# Patient Record
Sex: MALE | Race: WHITE | Employment: FULL TIME | URBAN - METROPOLITAN AREA
[De-identification: names, ages, dates, MRNs, and addresses within clinical notes are randomized per-mention and may not be internally consistent; named-entity substitution may affect disease eponyms.]

---

## 2021-07-19 ENCOUNTER — TELEPHONE (OUTPATIENT)
Dept: PAIN MANAGEMENT | Age: 71
End: 2021-07-19

## 2021-07-22 ENCOUNTER — PROCEDURE VISIT (OUTPATIENT)
Dept: PAIN MANAGEMENT | Age: 71
End: 2021-07-22
Payer: MEDICARE

## 2021-07-22 DIAGNOSIS — M47.817 LUMBOSACRAL SPONDYLOSIS WITHOUT MYELOPATHY: Primary | ICD-10-CM

## 2021-07-22 DIAGNOSIS — M47.812 CERVICAL SPONDYLOSIS WITHOUT MYELOPATHY: ICD-10-CM

## 2021-07-22 PROCEDURE — 64494 INJ PARAVERT F JNT L/S 2 LEV: CPT | Performed by: PAIN MEDICINE

## 2021-07-22 PROCEDURE — 3017F COLORECTAL CA SCREEN DOC REV: CPT | Performed by: PAIN MEDICINE

## 2021-07-22 PROCEDURE — 99213 OFFICE O/P EST LOW 20 MIN: CPT | Performed by: PAIN MEDICINE

## 2021-07-22 PROCEDURE — 4040F PNEUMOC VAC/ADMIN/RCVD: CPT | Performed by: PAIN MEDICINE

## 2021-07-22 PROCEDURE — 1123F ACP DISCUSS/DSCN MKR DOCD: CPT | Performed by: PAIN MEDICINE

## 2021-07-22 PROCEDURE — G8427 DOCREV CUR MEDS BY ELIG CLIN: HCPCS | Performed by: PAIN MEDICINE

## 2021-07-22 PROCEDURE — 64493 INJ PARAVERT F JNT L/S 1 LEV: CPT | Performed by: PAIN MEDICINE

## 2021-07-22 PROCEDURE — G8421 BMI NOT CALCULATED: HCPCS | Performed by: PAIN MEDICINE

## 2021-07-22 PROCEDURE — 1036F TOBACCO NON-USER: CPT | Performed by: PAIN MEDICINE

## 2021-07-22 RX ORDER — BETAMETHASONE SODIUM PHOSPHATE AND BETAMETHASONE ACETATE 3; 3 MG/ML; MG/ML
6 INJECTION, SUSPENSION INTRA-ARTICULAR; INTRALESIONAL; INTRAMUSCULAR; SOFT TISSUE ONCE
Status: COMPLETED | OUTPATIENT
Start: 2021-07-22 | End: 2021-07-22

## 2021-07-22 RX ORDER — LIDOCAINE HYDROCHLORIDE 10 MG/ML
2 INJECTION, SOLUTION EPIDURAL; INFILTRATION; INTRACAUDAL; PERINEURAL ONCE
Status: COMPLETED | OUTPATIENT
Start: 2021-07-22 | End: 2021-07-22

## 2021-07-22 RX ADMIN — LIDOCAINE HYDROCHLORIDE 2 ML: 10 INJECTION, SOLUTION EPIDURAL; INFILTRATION; INTRACAUDAL; PERINEURAL at 13:13

## 2021-07-22 RX ADMIN — BETAMETHASONE SODIUM PHOSPHATE AND BETAMETHASONE ACETATE 6 MG: 3; 3 INJECTION, SUSPENSION INTRA-ARTICULAR; INTRALESIONAL; INTRAMUSCULAR; SOFT TISSUE at 13:14

## 2021-07-22 ASSESSMENT — ENCOUNTER SYMPTOMS
BACK PAIN: 0
CONSTIPATION: 0
DIARRHEA: 0
NAUSEA: 0
SHORTNESS OF BREATH: 0

## 2021-07-22 NOTE — PROGRESS NOTES
[facility]) Physicians  Neurosurgery and Pain [facility]  [address], [address] [facility] , [address] [facility], [place]: [phone]  F: [phone]      [address]      Provider: Rafael Mora DO          Patient Name: Ashley Brand : 1950        Date: 2021       Ashley Brand is here today for interventional pain management. Standard ASIPP guidelines were followed and sterile technique used. Area was cleaned with Betadine x3. Informed consent was obtained. Fluoroscopic guidance was used for this procedure. Junction of superior articular process and transverse process was identified. For L5 dorsal primary ramus groove of sacral ala and SAP of S1 was identified. Appropriate length spinal needle was used and advanced to correct anatomic location. Negative aspiration was achieved. At each site approximately 1/2cc of 1% preservative free Lidocaine was injected without difficulty. 6mg celestone added. Patient tolerated the procedure well, no obvious complications occurred during the procedure. Patient was appropriately monitored and discharged home in stable condition with their usual motor strength. The patient will keep close track of pain over the next several hours and call our office tomorrow and let us know what percentage of pain relief is experienced. Post op Instructions were given to patient. Relevant and recent imaging reviewed with patient today. [x] Bilateral [] T12 [] S1      [] L1 [] S2     [] Right [] L2 [] S3      [x] L3      [] Left [x] L4         [x] L5 [] S. I. Patient had >80% pain relief following procedure with positive facet joint provocative maneuvers, schedule RF ablation.     Rafael Mora DO

## 2021-07-22 NOTE — PROGRESS NOTES
[facility]) Physicians  Neurosurgery and Pain [facility]  [address], [address] [place], [place] [address]., [place]: [phone]  F: [phone]        Teresita Jiang  ([id number])    7/22/2021    Subjective:     Teresita Jiang is 70 y.o. male who complains today of:     Chief Complaint   Patient presents with   Sánchez Saleh is here today for follow-up. The pain is mostly on left side of his back. Is worse with bending standing activity. Can be achy can be sharp. He has done therapy in the past.  He has failed conservative treatment. History of interventional pain management. Pain affects his quality life. No new weakness. Patient is not diabetic, he has cardiac issues. Lumbar x-rays from 2019 were reviewed in the chart. Patient takes Tylenol and over-the-counter anti-inflammatories. Some morning time pain. Plan: We discussed options in detail. Today we performed left L3-4-5 medial branch blocks for diagnostic purposes. He may be a candidate for radiofrequency ablation down the road. He has failed conservative treatment. Imaging studies were were reviewed in the chart. Question answered, chart reviewed. Patient defers physical therapy as it has not helped in the past and made the pain worse. Patient states down the road he may want to have his right side of his back done right now the left is worse. We will see him in follow-up in several weeks. Patient also has neck pain arthritis wants injections we will set him up next week for cervical medial branch blocks with dexamethasone. Stop Eloquis 3 days prior. Allergies:  Patient has no known allergies. History reviewed. No pertinent past medical history. History reviewed. No pertinent surgical history. History reviewed. No pertinent family history.   Social History     Socioeconomic History    Marital status:      Spouse name: Not on file    Number of children: Not on file    Years of education: Not on file    Highest education level: Not on file   Occupational History    Not on file   Tobacco Use    Smoking status: Former Smoker    Smokeless tobacco: Never Used   Substance and Sexual Activity    Alcohol use: Yes     Alcohol/week: 9.0 standard drinks     Types: 9 Glasses of wine per week    Drug use: Not on file    Sexual activity: Not on file   Other Topics Concern    Not on file   Social History Narrative    Not on file     Social Determinants of Health     Financial Resource Strain:     Difficulty of Paying Living Expenses:    Food Insecurity:     Worried About Running Out of Food in the Last Year:     [address] in the Last Year:    Transportation Needs:     Lack of Transportation (Medical):  Lack of Transportation (Non-Medical):    Physical Activity:     Days of Exercise per Week:     Minutes of Exercise per Session:    Stress:     Feeling of Stress :    Social Connections:     Frequency of Communication with Friends and Family:     Frequency of Social Gatherings with Friends and Family:     Attends [employer] Services:     Active Member of Clubs or Organizations:     Attends Club or Organization Meetings:     Marital Status:    Intimate Partner Violence:     Fear of Current or Ex-Partner:     Emotionally Abused:     Physically Abused:     Sexually Abused:        Current Outpatient Medications on File Prior to Visit   Medication Sig Dispense Refill    meclizine (ANTIVERT) 25 MG tablet TAKE 1 TABLET BY MOUTH TWICE DAILY  0    apixaban (ELIQUIS) 5 MG TABS tablet Take by mouth 2 times daily      aspirin 81 MG tablet Take 81 mg by mouth daily       No current facility-administered medications on file prior to visit. HPI    Review of Systems   Constitutional: Negative for fever. HENT: Negative for hearing loss. Respiratory: Negative for shortness of breath. Gastrointestinal: Negative for constipation, diarrhea and nausea.    Genitourinary: Negative for difficulty urinating. Musculoskeletal: Negative for back pain and neck pain. Skin: Negative for rash. Neurological: Negative for headaches. Hematological: Does not bruise/bleed easily. Psychiatric/Behavioral: Negative for sleep disturbance. Objective:     Vitals: There were no vitals taken for this visit. H:70 inches, W:205, RR:16    Physical Exam  Patient alert and oriented times three, recent and remote memory intact, mood and affect normal, judgement and insight normal. Strength functional for ambulation. Balance and coordinational functional. Visualized skin intact. No visualized cyanosis, pulses intact. Cranial nerves 2-12 grossly intact. No obvious upper motor neuron signs seen. Sensation intact to light touch. SLR negative. Tender along L lumber facet joints with pain and decreased ROM. Extension and rotation with muscle spasms. Negative Spurling's. Tender along mid/low cervical facet joints with pain and decreased ROM. Extension and rotation with muscle spasms. Assessment:      Diagnosis Orders   1. Lumbosacral spondylosis without myelopathy  MI INJ DX/THER AGNT PARAVERT FACET JOINT, LUMBAR/SAC, 1ST LEVEL    MI INJ DX/THER AGNT PARAVERT FACET JOINT, LUMBAR/SAC, 2ND LEVEL   2.  Cervical spondylosis without myelopathy  MI INJ DX/THER AGNT PARAVERT FACET JOINT, CERV/THORAC, 1ST LEVEL    MI INJ DX/THER AGNT PARAVERT FACET JOINT, CERV/THORAC, 2ND LEVEL       Plan:

## 2021-07-29 ENCOUNTER — PROCEDURE VISIT (OUTPATIENT)
Dept: PAIN MANAGEMENT | Age: 71
End: 2021-07-29
Payer: MEDICARE

## 2021-07-29 DIAGNOSIS — M47.812 CERVICAL SPONDYLOSIS WITHOUT MYELOPATHY: ICD-10-CM

## 2021-07-29 PROCEDURE — 64490 INJ PARAVERT F JNT C/T 1 LEV: CPT | Performed by: PAIN MEDICINE

## 2021-07-29 PROCEDURE — 64491 INJ PARAVERT F JNT C/T 2 LEV: CPT | Performed by: PAIN MEDICINE

## 2021-07-29 RX ORDER — LIDOCAINE HYDROCHLORIDE 10 MG/ML
2 INJECTION, SOLUTION EPIDURAL; INFILTRATION; INTRACAUDAL; PERINEURAL ONCE
Status: COMPLETED | OUTPATIENT
Start: 2021-07-29 | End: 2021-07-29

## 2021-07-29 RX ORDER — DEXAMETHASONE SODIUM PHOSPHATE 10 MG/ML
10 INJECTION, EMULSION INTRAMUSCULAR; INTRAVENOUS ONCE
Status: COMPLETED | OUTPATIENT
Start: 2021-07-29 | End: 2021-07-29

## 2021-07-29 RX ADMIN — LIDOCAINE HYDROCHLORIDE 2 ML: 10 INJECTION, SOLUTION EPIDURAL; INFILTRATION; INTRACAUDAL; PERINEURAL at 13:27

## 2021-07-29 RX ADMIN — DEXAMETHASONE SODIUM PHOSPHATE 10 MG: 10 INJECTION, EMULSION INTRAMUSCULAR; INTRAVENOUS at 13:27

## 2021-07-29 NOTE — PROGRESS NOTES
[facility]) Physicians  Neurosurgery and Pain [facility]  [address] [facility] , [address] [facility], [place]: [phone]  F: [phone]        [address] [facility] [address]    Provider: Ladan Mayfield DO          Patient Name: Eriberto Simpson : 1950        Date: 2021         Eriberto Simpson is here today for interventional pain management. Sterile technique was used. Fluoroscopic guidance was used. Patient positioned in prone position. Area was cleaned with Betadine. Informed consent was obtained. Appropriate length spinal needle was advanced to the anatomic location of the medial branch at the lateral mass utilizing intermittent fluoroscopy. Approximately 5mg Dexamethasone was divided equally at each level and 0.5cc of 1% PF Lidocaine was injected at each anatomic level without difficulty. Patient tolerated the procedure well, no obvious complications occurred during the procedure. Patient was appropriately monitored and discharged home in stable condition with their usual motor strength. The patient will keep close track of pain over the next several hours and call our office tomorrow and let us know what percentage of pain relief is experienced. Post op Instructions were given to patient. If on blood thiners patient was told to resume them post-procedure. Relevant and recent imaging reviewed with patient today. [x] Bilateral [] C2 [] C3      [] C4 [x] C5     [] Right [x] C6 [x] C7            [] Left                                      Patient had >80% pain relief following procedure with positive facet joint provocativemaneuvers, schedule RF ablation.       Ladan Mayfield DO

## 2021-10-22 ENCOUNTER — TELEPHONE (OUTPATIENT)
Dept: PAIN MANAGEMENT | Age: 71
End: 2021-10-22

## 2021-10-26 ENCOUNTER — PROCEDURE VISIT (OUTPATIENT)
Dept: PAIN MANAGEMENT | Age: 71
End: 2021-10-26
Payer: MEDICARE

## 2021-10-26 DIAGNOSIS — M47.817 LUMBOSACRAL SPONDYLOSIS WITHOUT MYELOPATHY: Primary | ICD-10-CM

## 2021-10-26 PROCEDURE — 64493 INJ PARAVERT F JNT L/S 1 LEV: CPT | Performed by: PAIN MEDICINE

## 2021-10-26 PROCEDURE — 64494 INJ PARAVERT F JNT L/S 2 LEV: CPT | Performed by: PAIN MEDICINE

## 2021-10-26 RX ORDER — BETAMETHASONE SODIUM PHOSPHATE AND BETAMETHASONE ACETATE 3; 3 MG/ML; MG/ML
6 INJECTION, SUSPENSION INTRA-ARTICULAR; INTRALESIONAL; INTRAMUSCULAR; SOFT TISSUE ONCE
Status: COMPLETED | OUTPATIENT
Start: 2021-10-26 | End: 2021-10-26

## 2021-10-26 RX ORDER — LIDOCAINE HYDROCHLORIDE 10 MG/ML
10 INJECTION, SOLUTION EPIDURAL; INFILTRATION; INTRACAUDAL; PERINEURAL ONCE
Status: COMPLETED | OUTPATIENT
Start: 2021-10-26 | End: 2021-10-26

## 2021-10-26 RX ADMIN — LIDOCAINE HYDROCHLORIDE 10 MG: 10 INJECTION, SOLUTION EPIDURAL; INFILTRATION; INTRACAUDAL; PERINEURAL at 15:54

## 2021-10-26 RX ADMIN — BETAMETHASONE SODIUM PHOSPHATE AND BETAMETHASONE ACETATE 6 MG: 3; 3 INJECTION, SUSPENSION INTRA-ARTICULAR; INTRALESIONAL; INTRAMUSCULAR; SOFT TISSUE at 15:54

## 2021-10-26 NOTE — PROGRESS NOTES
[facility]) Physicians  Neurosurgery and Pain [facility]  [address], [address] [facility] , [address] [place] [address]: [phone]  F: [phone]      [address]      Provider: Bony Balderrama DO          Patient Name: Liseth Salomon : 1950        Date: 10/26/2021       Liseth Salomon is here today for interventional pain management. Standard ASIPP guidelines were followed and sterile technique used. Area was cleaned with Betadine x3. Informed consent was obtained. Fluoroscopic guidance was used for this procedure. Junction of superior articular process and transverse process was identified. For L5 dorsal primary ramus groove of sacral ala and SAP of S1 was identified. Appropriate length spinal needle was used and advanced to correct anatomic location. Negative aspiration was achieved. At each site approximately 1/2cc of 1% preservative free Lidocaine was injected without difficulty. Patient tolerated the procedure well, no obvious complications occurred during the procedure. Patient was appropriately monitored and discharged home in stable condition with their usual motor strength. The patient will keep close track of pain over the next several hours and call our office tomorrow and let us know what percentage of pain relief is experienced. Post op Instructions were given to patient. Relevant and recent imaging reviewed with patient today. [x] Bilateral [] T12 [] S1      [] L1 [] S2     [] Right [] L2 [] S3      [x] L3      [] Left [x] L4         [x] L5 [] S. I. Patient had >80% pain relief following procedure with positive facet joint provocative maneuvers, schedule RF ablation.     Bony Balderrama DO

## 2021-11-01 ENCOUNTER — PROCEDURE VISIT (OUTPATIENT)
Dept: PAIN MANAGEMENT | Age: 71
End: 2021-11-01
Payer: MEDICARE

## 2021-11-01 DIAGNOSIS — M47.812 CERVICAL SPONDYLOSIS WITHOUT MYELOPATHY: Primary | ICD-10-CM

## 2021-11-01 PROCEDURE — 64490 INJ PARAVERT F JNT C/T 1 LEV: CPT | Performed by: PAIN MEDICINE

## 2021-11-01 PROCEDURE — 64491 INJ PARAVERT F JNT C/T 2 LEV: CPT | Performed by: PAIN MEDICINE

## 2021-11-01 RX ORDER — DEXAMETHASONE SODIUM PHOSPHATE 10 MG/ML
10 INJECTION, EMULSION INTRAMUSCULAR; INTRAVENOUS ONCE
Status: COMPLETED | OUTPATIENT
Start: 2021-11-01 | End: 2021-11-01

## 2021-11-01 RX ORDER — LIDOCAINE HYDROCHLORIDE 10 MG/ML
10 INJECTION, SOLUTION EPIDURAL; INFILTRATION; INTRACAUDAL; PERINEURAL ONCE
Status: COMPLETED | OUTPATIENT
Start: 2021-11-01 | End: 2021-11-01

## 2021-11-01 RX ADMIN — DEXAMETHASONE SODIUM PHOSPHATE 10 MG: 10 INJECTION, EMULSION INTRAMUSCULAR; INTRAVENOUS at 13:16

## 2021-11-01 RX ADMIN — LIDOCAINE HYDROCHLORIDE 10 MG: 10 INJECTION, SOLUTION EPIDURAL; INFILTRATION; INTRACAUDAL; PERINEURAL at 13:16

## 2021-11-01 NOTE — PROGRESS NOTES
[facility]) Physicians  Neurosurgery and Pain [facility]  [address] [facility] , [address] [facility], [place]: [phone]  F: [phone]        [address] [facility] [address]    Provider: Gabbi Pelayo DO          Patient Name: Barbi Alan : 1950        Date: 2021         Barbi Alan is here today for interventional pain management. Negative Spurling's. Tender along R mid/low cervical facet joints with pain and decreased ROM. Extension and rotation with muscle spasms. Sterile technique was used. Fluoroscopic guidance was used. Patient positioned in prone position. Area was cleaned with Betadine. Informed consent was obtained. Appropriate length spinal needle was advanced to the anatomic location of the medial branch at the lateral mass utilizing intermittent fluoroscopy. Approximately 5mg Dexamethasone was divided equally at each level and 0.5cc of 1% PF Lidocaine was injected at each anatomic level without difficulty. Patient tolerated the procedure well, no obvious complications occurred during the procedure. Patient was appropriately monitored and discharged home in stable condition with their usual motor strength. The patient will keep close track of pain over the next several hours and call our office tomorrow and let us know what percentage of pain relief is experienced. Post op Instructions were given to patient. If on blood thiners patient was told to resume them post-procedure. Relevant and recent imaging reviewed with patient today. [] Bilateral [] C2 [] C3      [x] C4 [x] C5     [x] Right [x] C6 [] C7            [] Left                                      Patient had >80% pain relief following procedure with positive facet joint provocativemaneuvers, schedule RF ablation.       Gabbi Pelayo DO

## 2022-02-18 ENCOUNTER — TELEPHONE (OUTPATIENT)
Dept: PAIN MANAGEMENT | Age: 72
End: 2022-02-18

## 2022-02-22 ENCOUNTER — PROCEDURE VISIT (OUTPATIENT)
Dept: PAIN MANAGEMENT | Age: 72
End: 2022-02-22
Payer: MEDICARE

## 2022-02-22 DIAGNOSIS — M47.817 LUMBOSACRAL SPONDYLOSIS WITHOUT MYELOPATHY: Primary | ICD-10-CM

## 2022-02-22 PROCEDURE — 64494 INJ PARAVERT F JNT L/S 2 LEV: CPT | Performed by: PAIN MEDICINE

## 2022-02-22 PROCEDURE — 64493 INJ PARAVERT F JNT L/S 1 LEV: CPT | Performed by: PAIN MEDICINE

## 2022-02-22 RX ORDER — LIDOCAINE HYDROCHLORIDE 10 MG/ML
10 INJECTION, SOLUTION EPIDURAL; INFILTRATION; INTRACAUDAL; PERINEURAL ONCE
Status: COMPLETED | OUTPATIENT
Start: 2022-02-22 | End: 2022-02-22

## 2022-02-22 RX ORDER — BETAMETHASONE SODIUM PHOSPHATE AND BETAMETHASONE ACETATE 3; 3 MG/ML; MG/ML
6 INJECTION, SUSPENSION INTRA-ARTICULAR; INTRALESIONAL; INTRAMUSCULAR; SOFT TISSUE ONCE
Status: COMPLETED | OUTPATIENT
Start: 2022-02-22 | End: 2022-02-22

## 2022-02-22 RX ADMIN — BETAMETHASONE SODIUM PHOSPHATE AND BETAMETHASONE ACETATE 6 MG: 3; 3 INJECTION, SUSPENSION INTRA-ARTICULAR; INTRALESIONAL; INTRAMUSCULAR; SOFT TISSUE at 10:30

## 2022-02-22 RX ADMIN — LIDOCAINE HYDROCHLORIDE 10 MG: 10 INJECTION, SOLUTION EPIDURAL; INFILTRATION; INTRACAUDAL; PERINEURAL at 10:30

## 2022-02-22 NOTE — PROGRESS NOTES
[facility]) Physicians  Neurosurgery and Pain [facility]  [address] [facility] , [address] [facility], [place]: [phone]  F: [phone]      [address]      Provider: Vega Perry DO          Patient Name: Priyanka Montelongo : 1950        Date: 2022       Priyanka Montelongo is here today for interventional pain management. Standard ASIPP guidelines were followed and sterile technique used. Area was cleaned with Betadine x3. Informed consent was obtained. Fluoroscopic guidance was used for this procedure. Junction of superior articular process and transverse process was identified. For L5 dorsal primary ramus groove of sacral ala and SAP of S1 was identified. Appropriate length spinal needle was used and advanced to correct anatomic location. Negative aspiration was achieved. At each site approximately 1/2cc of 1% preservative free Lidocaine was injected without difficulty. 6mg celestone added. Patient tolerated the procedure well, no obvious complications occurred during the procedure. Patient was appropriately monitored and discharged home in stable condition with their usual motor strength. The patient will keep close track of pain over the next several hours and call our office tomorrow and let us know what percentage of pain relief is experienced. Post op Instructions were given to patient. Relevant and recent imaging reviewed with patient today. [x] Bilateral [] T12 [] S1      [] L1 [] S2     [] Right [] L2 [] S3      [x] L3      [] Left [x] L4         [x] L5 [] S. I.                        May need second MBB    Vega Perry DO

## 2022-05-10 ENCOUNTER — TELEPHONE (OUTPATIENT)
Dept: PAIN MANAGEMENT | Age: 72
End: 2022-05-10

## 2022-05-17 ENCOUNTER — PROCEDURE VISIT (OUTPATIENT)
Dept: PAIN MANAGEMENT | Age: 72
End: 2022-05-17
Payer: MEDICARE

## 2022-05-17 DIAGNOSIS — M47.817 LUMBOSACRAL SPONDYLOSIS WITHOUT MYELOPATHY: Primary | ICD-10-CM

## 2022-05-17 PROCEDURE — 64494 INJ PARAVERT F JNT L/S 2 LEV: CPT | Performed by: PAIN MEDICINE

## 2022-05-17 PROCEDURE — 64493 INJ PARAVERT F JNT L/S 1 LEV: CPT | Performed by: PAIN MEDICINE

## 2022-05-17 RX ORDER — BETAMETHASONE SODIUM PHOSPHATE AND BETAMETHASONE ACETATE 3; 3 MG/ML; MG/ML
6 INJECTION, SUSPENSION INTRA-ARTICULAR; INTRALESIONAL; INTRAMUSCULAR; SOFT TISSUE ONCE
Status: COMPLETED | OUTPATIENT
Start: 2022-05-17 | End: 2022-05-17

## 2022-05-17 RX ORDER — LIDOCAINE HYDROCHLORIDE 10 MG/ML
10 INJECTION, SOLUTION EPIDURAL; INFILTRATION; INTRACAUDAL; PERINEURAL ONCE
Status: COMPLETED | OUTPATIENT
Start: 2022-05-17 | End: 2022-05-17

## 2022-05-17 RX ADMIN — LIDOCAINE HYDROCHLORIDE 10 MG: 10 INJECTION, SOLUTION EPIDURAL; INFILTRATION; INTRACAUDAL; PERINEURAL at 15:35

## 2022-05-17 RX ADMIN — BETAMETHASONE SODIUM PHOSPHATE AND BETAMETHASONE ACETATE 6 MG: 3; 3 INJECTION, SUSPENSION INTRA-ARTICULAR; INTRALESIONAL; INTRAMUSCULAR; SOFT TISSUE at 15:35

## 2022-05-17 NOTE — PROGRESS NOTES
[facility]) Physicians  Neurosurgery and Pain [facility]  [address] [facility] , [address] [facility], [place]: [phone]  F: [phone]      [address]      Provider: Sheela Luis DO          Patient Name: Kenney Munoz : 1950        Date: 2022       Kenney Munoz is here today for interventional pain management. Standard ASIPP guidelines were followed and sterile technique used. Area was cleaned with Betadine x3. Informed consent was obtained. Fluoroscopic guidance was used for this procedure. Junction of superior articular process and transverse process was identified. For L5 dorsal primary ramus groove of sacral ala and SAP of S1 was identified. Appropriate length spinal needle was used and advanced to correct anatomic location. Negative aspiration was achieved. At each site approximately 1/2cc of 1% preservative free Lidocaine was injected without difficulty. 6 mg Celestone added    Patient tolerated the procedure well, no obvious complications occurred during the procedure. Patient was appropriately monitored and discharged home in stable condition with their usual motor strength. The patient will keep close track of pain over the next several hours and call our office tomorrow and let us know what percentage of pain relief is experienced. Post op Instructions were given to patient. Relevant and recent imaging reviewed with patient today. [x] Bilateral [] T12 [] S1      [] L1 [] S2     [] Right [] L2 [] S3      [x] L3      [] Left [x] L4         [x] L5 [] S. I.                      May need RF    Sheela Luis DO

## 2022-05-24 ENCOUNTER — PROCEDURE VISIT (OUTPATIENT)
Dept: PAIN MANAGEMENT | Age: 72
End: 2022-05-24
Payer: MEDICARE

## 2022-05-24 DIAGNOSIS — M47.812 CERVICAL SPONDYLOSIS WITHOUT MYELOPATHY: Primary | ICD-10-CM

## 2022-05-24 PROCEDURE — 64491 INJ PARAVERT F JNT C/T 2 LEV: CPT | Performed by: PAIN MEDICINE

## 2022-05-24 PROCEDURE — 64490 INJ PARAVERT F JNT C/T 1 LEV: CPT | Performed by: PAIN MEDICINE

## 2022-05-24 RX ORDER — LIDOCAINE HYDROCHLORIDE 10 MG/ML
10 INJECTION, SOLUTION EPIDURAL; INFILTRATION; INTRACAUDAL; PERINEURAL ONCE
Status: COMPLETED | OUTPATIENT
Start: 2022-05-24 | End: 2022-05-24

## 2022-05-24 RX ORDER — DEXAMETHASONE SODIUM PHOSPHATE 10 MG/ML
10 INJECTION, EMULSION INTRAMUSCULAR; INTRAVENOUS ONCE
Status: COMPLETED | OUTPATIENT
Start: 2022-05-24 | End: 2022-05-24

## 2022-05-24 RX ADMIN — LIDOCAINE HYDROCHLORIDE 10 MG: 10 INJECTION, SOLUTION EPIDURAL; INFILTRATION; INTRACAUDAL; PERINEURAL at 10:40

## 2022-05-24 RX ADMIN — DEXAMETHASONE SODIUM PHOSPHATE 10 MG: 10 INJECTION, EMULSION INTRAMUSCULAR; INTRAVENOUS at 10:41

## 2022-05-24 NOTE — PROGRESS NOTES
[facility]) Physicians  Neurosurgery and Pain [place] [address] [facility], [address] [facility] , [address] [facility], [place]: [phone]  F: [phone]        [address] [facility] [address]    Provider: Ladan Mayfield DO          Patient Name: Eriberto Simpson : 1950        Date: 2022         Eriberto Simpson is here today for interventional pain management. Sterile technique was used. Fluoroscopic guidance was used. Patient positioned in prone position. Area was cleaned with Betadine. Informed consent was obtained. Appropriate length spinal needle was advanced to the anatomic location of the medial branch at the lateral mass utilizing intermittent fluoroscopy. Approximately 5mg Dexamethasone was divided equally at each level and 0.5cc of 1% PF Lidocaine was injected at each anatomic level without difficulty. Patient tolerated the procedure well, no obvious complications occurred during the procedure. Patient was appropriately monitored and discharged home in stable condition with their usual motor strength. The patient will keep close track of pain over the next several hours and call our office tomorrow and let us know what percentage of pain relief is experienced. Post op Instructions were given to patient. If on blood thiners patient was told to resume them post-procedure. Relevant and recent imaging reviewed with patient today. [x] Bilateral [] C2 [] C3      [x] C4 [x] C5     [] Right [x] C6 [] C7            [] Left                                      Patient had >80% pain relief following procedure with positive facet joint provocativemaneuvers, schedule RF ablation.       Ladan Mayfield DO

## 2022-09-14 ENCOUNTER — TELEPHONE (OUTPATIENT)
Dept: PAIN MANAGEMENT | Age: 72
End: 2022-09-14

## 2022-09-14 NOTE — TELEPHONE ENCOUNTER
Patient asks if he can schedule bilateral back injections and right neck injections.     Last received 5/17/22 and 5/24/22

## 2022-09-27 ENCOUNTER — PROCEDURE VISIT (OUTPATIENT)
Dept: PAIN MANAGEMENT | Age: 72
End: 2022-09-27
Payer: MEDICARE

## 2022-09-27 DIAGNOSIS — M47.817 LUMBOSACRAL SPONDYLOSIS WITHOUT MYELOPATHY: Primary | ICD-10-CM

## 2022-09-27 PROCEDURE — 64493 INJ PARAVERT F JNT L/S 1 LEV: CPT | Performed by: PAIN MEDICINE

## 2022-09-27 PROCEDURE — 64494 INJ PARAVERT F JNT L/S 2 LEV: CPT | Performed by: PAIN MEDICINE

## 2022-09-27 RX ORDER — LIDOCAINE HYDROCHLORIDE 10 MG/ML
10 INJECTION, SOLUTION EPIDURAL; INFILTRATION; INTRACAUDAL; PERINEURAL ONCE
Status: COMPLETED | OUTPATIENT
Start: 2022-09-27 | End: 2022-09-27

## 2022-09-27 RX ORDER — BETAMETHASONE SODIUM PHOSPHATE AND BETAMETHASONE ACETATE 3; 3 MG/ML; MG/ML
6 INJECTION, SUSPENSION INTRA-ARTICULAR; INTRALESIONAL; INTRAMUSCULAR; SOFT TISSUE ONCE
Status: COMPLETED | OUTPATIENT
Start: 2022-09-27 | End: 2022-09-27

## 2022-09-27 RX ADMIN — BETAMETHASONE SODIUM PHOSPHATE AND BETAMETHASONE ACETATE 6 MG: 3; 3 INJECTION, SUSPENSION INTRA-ARTICULAR; INTRALESIONAL; INTRAMUSCULAR; SOFT TISSUE at 13:20

## 2022-09-27 RX ADMIN — LIDOCAINE HYDROCHLORIDE 10 MG: 10 INJECTION, SOLUTION EPIDURAL; INFILTRATION; INTRACAUDAL; PERINEURAL at 13:19

## 2022-09-27 NOTE — PROGRESS NOTES
[facility]) Physicians  Neurosurgery and Pain [facility]  [address] [facility] , [address] [facility], [place]: [phone]  F: [phone]      [address]      Provider: Jeri De Guzman DO          Patient Name: Manoj Gonzales : 1950        Date: 2022       Manoj Gonzales is here today for interventional pain management. Standard ASIPP guidelines were followed and sterile technique used. Area was cleaned with Betadine x3. Informed consent was obtained. Fluoroscopic guidance was used for this procedure. Junction of superior articular process and transverse process was identified. For L5 dorsal primary ramus groove of sacral ala and SAP of S1 was identified. Appropriate length spinal needle was used and advanced to correct anatomic location. Negative aspiration was achieved. At each site approximately 1/2cc of 1% preservative free Lidocaine was injected without difficulty. 6mg celestone added. Patient tolerated the procedure well, no obvious complications occurred during the procedure. Patient was appropriately monitored and discharged home in stable condition with their usual motor strength. The patient will keep close track of pain over the next several hours and call our office tomorrow and let us know what percentage of pain relief is experienced. Post op Instructions were given to patient. Relevant and recent imaging reviewed with patient today. [x] Bilateral [] T12 [] S1      [] L1 [] S2     [] Right [] L2 [] S3      [x] L3      [] Left [x] L4         [x] L5 [] KATY Calloway DO
All other review of systems negative, except as noted in HPI

## 2022-10-11 ENCOUNTER — PROCEDURE VISIT (OUTPATIENT)
Dept: PAIN MANAGEMENT | Age: 72
End: 2022-10-11
Payer: MEDICARE

## 2022-10-11 DIAGNOSIS — M47.812 CERVICAL SPONDYLOSIS WITHOUT MYELOPATHY: Primary | ICD-10-CM

## 2022-10-11 PROCEDURE — 64491 INJ PARAVERT F JNT C/T 2 LEV: CPT | Performed by: PAIN MEDICINE

## 2022-10-11 PROCEDURE — 64490 INJ PARAVERT F JNT C/T 1 LEV: CPT | Performed by: PAIN MEDICINE

## 2022-10-11 RX ORDER — DEXAMETHASONE SODIUM PHOSPHATE 10 MG/ML
10 INJECTION, SOLUTION INTRAMUSCULAR; INTRAVENOUS ONCE
Status: COMPLETED | OUTPATIENT
Start: 2022-10-11 | End: 2022-10-11

## 2022-10-11 RX ORDER — DEXAMETHASONE SODIUM PHOSPHATE 10 MG/ML
10 INJECTION, EMULSION INTRAMUSCULAR; INTRAVENOUS ONCE
Status: SHIPPED | OUTPATIENT
Start: 2022-10-11

## 2022-10-11 RX ORDER — LIDOCAINE HYDROCHLORIDE 10 MG/ML
10 INJECTION, SOLUTION EPIDURAL; INFILTRATION; INTRACAUDAL; PERINEURAL ONCE
Status: COMPLETED | OUTPATIENT
Start: 2022-10-11 | End: 2022-10-11

## 2022-10-11 RX ADMIN — DEXAMETHASONE SODIUM PHOSPHATE 10 MG: 10 INJECTION, SOLUTION INTRAMUSCULAR; INTRAVENOUS at 14:37

## 2022-10-11 RX ADMIN — LIDOCAINE HYDROCHLORIDE 10 MG: 10 INJECTION, SOLUTION EPIDURAL; INFILTRATION; INTRACAUDAL; PERINEURAL at 14:37

## 2022-10-11 NOTE — PROGRESS NOTES
[facility]) Physicians  Neurosurgery and Pain [facility]  [address] [facility] , [address], [place]: [phone]  F: [phone]        [address] [facility] [address]    Provider: Bri Mcallister DO          Patient Name: Qian Stein : 1950        Date: 10/11/2022         Qian Stein is here today for interventional pain management. Sterile technique was used. Fluoroscopic guidance was used. Patient positioned in prone position. Area was cleaned with Betadine. Informed consent was obtained. Appropriate length spinal needle was advanced to the anatomic location of the medial branch at the lateral mass utilizing intermittent fluoroscopy. Approximately 5mg Dexamethasone was divided equally at each level and 0.5cc of 1% PF Lidocaine was injected at each anatomic level without difficulty. Patient tolerated the procedure well, no obvious complications occurred during the procedure. Patient was appropriately monitored and discharged home in stable condition with their usual motor strength. The patient will keep close track of pain over the next several hours and call our office tomorrow and let us know what percentage of pain relief is experienced. Post op Instructions were given to patient. If on blood thiners patient was told to resume them post-procedure. Relevant and recent imaging reviewed with patient today.              [] Bilateral [] C2 [] C3      [x] C4 [x] C5     [x] Right [x] C6 [] C7            [] Left                                            Bri Mcallister DO

## 2022-12-19 ENCOUNTER — TELEPHONE (OUTPATIENT)
Dept: PAIN MANAGEMENT | Age: 72
End: 2022-12-19

## 2022-12-19 NOTE — TELEPHONE ENCOUNTER
Patient had his MBB's done in October 2022 with 85% relief and is asking if can now schedule his RFA?

## 2022-12-27 NOTE — TELEPHONE ENCOUNTER
Pt states he takes eliquis prescribed Dr. Williams Jackson at [facility]. Does pt have to stop blood thinners for lumbar RFA?

## 2022-12-28 NOTE — TELEPHONE ENCOUNTER
LVM to schedule patient's Cervical RFA and ask him which side he'd prefer having done first, the right or the left?

## 2023-01-09 ENCOUNTER — OFFICE VISIT (OUTPATIENT)
Dept: PAIN MANAGEMENT | Age: 73
End: 2023-01-09

## 2023-01-09 DIAGNOSIS — M47.817 LUMBOSACRAL SPONDYLOSIS WITHOUT MYELOPATHY: Primary | ICD-10-CM

## 2023-01-09 RX ORDER — DEXAMETHASONE SODIUM PHOSPHATE 10 MG/ML
10 INJECTION, EMULSION INTRAMUSCULAR; INTRAVENOUS ONCE
Status: DISCONTINUED | OUTPATIENT
Start: 2023-01-09 | End: 2023-01-09

## 2023-01-09 RX ORDER — BETAMETHASONE SODIUM PHOSPHATE AND BETAMETHASONE ACETATE 3; 3 MG/ML; MG/ML
6 INJECTION, SUSPENSION INTRA-ARTICULAR; INTRALESIONAL; INTRAMUSCULAR; SOFT TISSUE ONCE
Status: COMPLETED | OUTPATIENT
Start: 2023-01-09 | End: 2023-01-09

## 2023-01-09 RX ORDER — LIDOCAINE HYDROCHLORIDE 10 MG/ML
10 INJECTION, SOLUTION EPIDURAL; INFILTRATION; INTRACAUDAL; PERINEURAL ONCE
Status: COMPLETED | OUTPATIENT
Start: 2023-01-09 | End: 2023-01-09

## 2023-01-09 RX ADMIN — LIDOCAINE HYDROCHLORIDE 10 MG: 10 INJECTION, SOLUTION EPIDURAL; INFILTRATION; INTRACAUDAL; PERINEURAL at 10:08

## 2023-01-09 RX ADMIN — BETAMETHASONE SODIUM PHOSPHATE AND BETAMETHASONE ACETATE 6 MG: 3; 3 INJECTION, SUSPENSION INTRA-ARTICULAR; INTRALESIONAL; INTRAMUSCULAR; SOFT TISSUE at 10:08

## 2023-01-09 NOTE — PROGRESS NOTES
[facility]) Physicians  Neurosurgery and Pain [facility]  [address] [facility] , [address] [facility], [place]: [phone]  F: [phone]      [address]      Provider: Malik Melgoza DO          Patient Name: Lamar Mejia : 1950        Date: 2023       Lamar Mejia is here today for interventional pain management. Majority of symptoms on the left low back. Neck has improved considerably does not want injections there today. Standard ASIPP guidelines were followed and sterile technique used. Area was cleaned with Betadine x3. Informed consent was obtained. Fluoroscopic guidance was used for this procedure. Junction of superior articular process and transverse process was identified. For L5 dorsal primary ramus groove of sacral ala and SAP of S1 was identified. Appropriate length spinal needle was used and advanced to correct anatomic location. Negative aspiration was achieved. At each site approximately 1/2cc of 1% preservative free Lidocaine was injected without difficulty. 6 mg Celestone added    Patient tolerated the procedure well, no obvious complications occurred during the procedure. Patient was appropriately monitored and discharged home in stable condition with their usual motor strength. The patient will keep close track of pain over the next several hours and call our office tomorrow and let us know what percentage of pain relief is experienced. Post op Instructions were given to patient. Relevant and recent imaging reviewed with patient today. [] Bilateral [] T12 [] S1      [] L1 [] S2     [] Right [] L2 [] S3      [x] L3      [x] Left [x] L4         [x] L5 [] KATY Bnoe DO

## 2023-03-17 ENCOUNTER — TELEPHONE (OUTPATIENT)
Dept: PAIN MANAGEMENT | Age: 73
End: 2023-03-17

## 2023-03-27 ENCOUNTER — PROCEDURE VISIT (OUTPATIENT)
Dept: PAIN MANAGEMENT | Age: 73
End: 2023-03-27
Payer: MEDICARE

## 2023-03-27 DIAGNOSIS — M47.817 LUMBOSACRAL SPONDYLOSIS WITHOUT MYELOPATHY: Primary | ICD-10-CM

## 2023-03-27 PROCEDURE — 64494 INJ PARAVERT F JNT L/S 2 LEV: CPT | Performed by: PAIN MEDICINE

## 2023-03-27 PROCEDURE — 64493 INJ PARAVERT F JNT L/S 1 LEV: CPT | Performed by: PAIN MEDICINE

## 2023-03-27 PROCEDURE — 64495 INJ PARAVERT F JNT L/S 3 LEV: CPT | Performed by: PAIN MEDICINE

## 2023-03-27 RX ORDER — LIDOCAINE HYDROCHLORIDE 10 MG/ML
10 INJECTION, SOLUTION EPIDURAL; INFILTRATION; INTRACAUDAL; PERINEURAL ONCE
Status: COMPLETED | OUTPATIENT
Start: 2023-03-27 | End: 2023-03-27

## 2023-03-27 RX ADMIN — LIDOCAINE HYDROCHLORIDE 10 MG: 10 INJECTION, SOLUTION EPIDURAL; INFILTRATION; INTRACAUDAL; PERINEURAL at 15:03

## 2023-03-27 NOTE — PROGRESS NOTES
[facility]) Physicians  Neurosurgery and Pain [facility]  [address] [facility] [address], [address] [facility] , [address] [facility], [place]: [phone]  F: [phone]      Amanda Núñez      Provider: Shaquille Hwang DO          Patient Name: Tino Smith : 1950        Date: 3/27/2023       Tino Smith is here today for interventional pain management. Standard ASIPP guidelines were followed and sterile technique used. Area was cleaned with Betadine x3. Informed consent was obtained. Fluoroscopic guidance was used for this procedure. Junction of superior articular process and transverse process was identified. For L5 dorsal primary ramus groove of sacral ala and SAP of S1 was identified. Appropriate length spinal needle was used and advanced to correct anatomic location. Negative aspiration was achieved. At each site approximately 1/2cc of 1% preservative free Lidocaine was injected without difficulty. Patient tolerated the procedure well, no obvious complications occurred during the procedure. Patient was appropriately monitored and discharged home in stable condition with their usual motor strength. The patient will keep close track of pain over the next several hours and call our office tomorrow and let us know what percentage of pain relief is experienced. Post op Instructions were given to patient. Relevant and recent imaging reviewed with patient today. [] Bilateral [] T12 [] S1      [] L1 [] S2     [] Right [] L2 [] S3      [x] L3      [x] Left [x] L4         [x] L5 [] S. I. Patient had >80% pain relief following procedure with positive facet joint provocative maneuvers, schedule RF ablation.     Shaquille Hwang DO

## 2023-04-03 ENCOUNTER — OFFICE VISIT (OUTPATIENT)
Dept: PAIN MANAGEMENT | Age: 73
End: 2023-04-03
Payer: MEDICARE

## 2023-04-03 DIAGNOSIS — M47.812 CERVICAL SPONDYLOSIS WITHOUT MYELOPATHY: Primary | ICD-10-CM

## 2023-04-03 DIAGNOSIS — M47.817 LUMBOSACRAL SPONDYLOSIS WITHOUT MYELOPATHY: Primary | ICD-10-CM

## 2023-04-03 DIAGNOSIS — M47.817 LUMBOSACRAL SPONDYLOSIS WITHOUT MYELOPATHY: ICD-10-CM

## 2023-04-03 PROCEDURE — 64636 DESTROY L/S FACET JNT ADDL: CPT | Performed by: PAIN MEDICINE

## 2023-04-03 PROCEDURE — 64635 DESTROY LUMB/SAC FACET JNT: CPT | Performed by: PAIN MEDICINE

## 2023-04-03 RX ORDER — LIDOCAINE HYDROCHLORIDE 10 MG/ML
10 INJECTION, SOLUTION EPIDURAL; INFILTRATION; INTRACAUDAL; PERINEURAL ONCE
Status: COMPLETED | OUTPATIENT
Start: 2023-04-03 | End: 2023-04-03

## 2023-04-03 RX ORDER — BETAMETHASONE SODIUM PHOSPHATE AND BETAMETHASONE ACETATE 3; 3 MG/ML; MG/ML
6 INJECTION, SUSPENSION INTRA-ARTICULAR; INTRALESIONAL; INTRAMUSCULAR; SOFT TISSUE ONCE
Status: COMPLETED | OUTPATIENT
Start: 2023-04-03 | End: 2023-04-03

## 2023-04-03 RX ADMIN — BETAMETHASONE SODIUM PHOSPHATE AND BETAMETHASONE ACETATE 6 MG: 3; 3 INJECTION, SUSPENSION INTRA-ARTICULAR; INTRALESIONAL; INTRAMUSCULAR; SOFT TISSUE at 14:20

## 2023-04-03 RX ADMIN — LIDOCAINE HYDROCHLORIDE 10 MG: 10 INJECTION, SOLUTION EPIDURAL; INFILTRATION; INTRACAUDAL; PERINEURAL at 14:21

## 2023-04-03 NOTE — PROGRESS NOTES
[facility]) Physicians  Neurosurgery and Pain [address] [facility] [place]: [phone]  F: [phone]      Lumbar Radio Frequency Ablation     Provider: Dae Rizzo DO          Patient Name: Meryl Montelongo : 1950        Date: 4/3/2023      Daneil Jayda is here today for interventional pain management. Standard ASIPP guidelines were followed and sterile technique used. Area was cleaned with Betadine x3. Informed consent was obtained. Fluoroscopic guidance was used for this procedure. Multiple views of fluoroscopy were used during procedure to assist with needle placement. Appropriate sized RF 10mm active tip needle was used and advance to appropriate anatomic location. There was appropriate multifidus contraction noted with motor stimulation at 2 Hz between 0.5-1.5 volts. No limb or gluteal contraction was noted taking it up to 3.5 volts. Prior to lesioning at 80 degrees Celsius for 90 seconds, approximately 0.75mg/1mg of Celestone and ½ cc of 1% preservative free Lidocaine was injected. Impedance was between 200-500 ohms during the procedure. Patient tolerated the procedure well, no obvious complications occurred during the procedure. Patient was appropriately monitored and discharged home in stable condition with their usual motor strength. Post Op instructions were given to patient.           [] Bilateral [] T11 [] L1 [] S1     [] T12 [] L2 [] S2    [] Right  [x] L3 [] S3      [x] L4 [] S4    [x] Left  [x] L5                              Dae Rizzo DO

## 2023-06-03 PROBLEM — D12.6 POLYP OF COLON, ADENOMATOUS: Status: ACTIVE | Noted: 2023-06-03

## 2023-06-03 PROBLEM — E78.2 MIXED HYPERLIPIDEMIA: Status: ACTIVE | Noted: 2023-06-03

## 2023-06-03 PROBLEM — R20.0 NUMBNESS AND TINGLING IN LEFT ARM: Status: ACTIVE | Noted: 2023-06-03

## 2023-06-03 PROBLEM — I10 BENIGN ESSENTIAL HYPERTENSION: Status: ACTIVE | Noted: 2023-06-03

## 2023-06-03 PROBLEM — Z98.61 CAD S/P PERCUTANEOUS CORONARY ANGIOPLASTY: Status: ACTIVE | Noted: 2023-06-03

## 2023-06-03 PROBLEM — R06.02 SHORTNESS OF BREATH: Status: ACTIVE | Noted: 2023-06-03

## 2023-06-03 PROBLEM — R20.2 NUMBNESS AND TINGLING IN LEFT ARM: Status: ACTIVE | Noted: 2023-06-03

## 2023-06-03 PROBLEM — Z86.0101 HISTORY OF ADENOMATOUS POLYP OF COLON: Status: ACTIVE | Noted: 2023-06-03

## 2023-06-03 PROBLEM — I73.9 INTERMITTENT CLAUDICATION (CMS-HCC): Status: ACTIVE | Noted: 2023-06-03

## 2023-06-03 PROBLEM — R09.81 NASAL CONGESTION: Status: ACTIVE | Noted: 2023-06-03

## 2023-06-03 PROBLEM — J44.9 CHRONIC OBSTRUCTIVE PULMONARY DISEASE (MULTI): Status: ACTIVE | Noted: 2023-06-03

## 2023-06-03 PROBLEM — I70.213 ATHEROSCLEROSIS OF BOTH LOWER EXTREMITIES WITH INTERMITTENT CLAUDICATION (CMS-HCC): Status: ACTIVE | Noted: 2023-06-03

## 2023-06-03 PROBLEM — Z86.010 HISTORY OF ADENOMATOUS POLYP OF COLON: Status: ACTIVE | Noted: 2023-06-03

## 2023-06-03 PROBLEM — I25.10 CAD S/P PERCUTANEOUS CORONARY ANGIOPLASTY: Status: ACTIVE | Noted: 2023-06-03

## 2023-06-03 PROBLEM — I73.9 PAD (PERIPHERAL ARTERY DISEASE) (CMS-HCC): Status: ACTIVE | Noted: 2023-06-03

## 2023-06-03 PROBLEM — I48.11 LONGSTANDING PERSISTENT ATRIAL FIBRILLATION (MULTI): Status: ACTIVE | Noted: 2023-06-03

## 2023-06-03 PROBLEM — M19.90 ARTHRITIS: Status: ACTIVE | Noted: 2023-06-03

## 2023-06-03 RX ORDER — DOFETILIDE 0.25 MG/1
1 CAPSULE ORAL 2 TIMES DAILY
COMMUNITY
Start: 2022-01-25 | End: 2023-12-18

## 2023-06-03 RX ORDER — LISINOPRIL 20 MG/1
1 TABLET ORAL 2 TIMES DAILY
COMMUNITY
Start: 2018-05-07 | End: 2023-10-09 | Stop reason: SDUPTHER

## 2023-06-03 RX ORDER — SIMVASTATIN 40 MG/1
1 TABLET, FILM COATED ORAL NIGHTLY
COMMUNITY
Start: 2016-06-15 | End: 2024-03-18

## 2023-06-03 RX ORDER — MULTIVITAMIN
1 TABLET ORAL DAILY
COMMUNITY
End: 2023-10-03 | Stop reason: WASHOUT

## 2023-06-03 RX ORDER — VITS A,C,E/LUTEIN/MINERALS 300MCG-200
1 TABLET ORAL DAILY
COMMUNITY

## 2023-06-03 RX ORDER — METOPROLOL TARTRATE 25 MG/1
1 TABLET, FILM COATED ORAL 2 TIMES DAILY
COMMUNITY
Start: 2015-02-04 | End: 2024-03-15

## 2023-06-03 RX ORDER — NITROGLYCERIN 0.4 MG/1
0.4 TABLET SUBLINGUAL EVERY 5 MIN PRN
COMMUNITY
Start: 2015-07-28

## 2023-06-03 RX ORDER — GUAIFENESIN 1200 MG
TABLET, EXTENDED RELEASE 12 HR ORAL
COMMUNITY
Start: 2018-05-07

## 2023-06-03 RX ORDER — ASPIRIN 81 MG/1
1 TABLET ORAL 2 TIMES DAILY
COMMUNITY

## 2023-06-05 NOTE — PROGRESS NOTES
Subjective   Patient ID:  Gutierrez Aldana is a 73 y.o. male patient who presents today for Hyperlipidemia, Hypertension, Atrial Fibrillation, Coronary Artery Disease, Claudication, and Arthritis.    Past Medical, Surgical, and Family History reviewed and updated in chart.     Reviewed all medications by prescribing practitioner or clinical pharmacist (such as prescriptions, OTCs, herbal therapies and supplements) and documented in the medical record.     Hypertension: The patient is here for follow-up of elevated blood pressure. He is adherent to a low salt diet.  The patient is compliant with medications. Patient denies any side effects to the medications.     Hyperlipidemia: The patient is present today for a follow up of hyperlipidemia. He denies having any leg cramping in particular. He is trying to follow a low-fat diet. The patient is compliant with medications, which they are currently taking the following Simvastatin 40 mg QHS. Patient denies any side effects to the medications.     Atrial Fibrillation: The patient presents for a follow up of A-Fib.  The patient is compliant with medications. Patient denies any side effects to the medications.     Coronary Artery Disease: Patient is present today for a follow up of CAD. He denies having any symptoms of shortness of breath, diaphoresis, nausea, and heartburn. The patient reports that he is in stable condition at the present time.     Claudication: The patient is present today for a follow up of claudication. The patient reports that his left leg is bothersome.     Arthritis: The patient is present today for a follow up of arthritis. The patient states that their pain is located in multiple joints. The patient reports that his left leg is numb, tingling, and stiff. The pain radiates to the lower extremities, the pain is worse in the morning than at night. He states that the pain gradually improves with walking throughout the day. He does not have any back  "concerns at this present time. The patient voices that he is careful when he walks up and down stairs.     Adenomatous polyp of colon: Patient states that he would like a colonoscopy scheduled due to having a history of adenomatous polyps of his colon.       The patient denies having the following symptoms: chest pain, chest pressure, fever, chills, N/V/D, constipation, dizziness, headaches, SOB.      Patient Care Team:  Juan Ramon De Guzman MD as PCP - General  NABILA Espinoza-CNP as PCP - [facility] Attributed Provider      Objective   Vitals:  /66   Pulse 78   Temp 36.9 °C (98.4 °F)   Resp 18   Ht 1.791 m (5' 10.5\")   Wt 93.1 kg (205 lb 3.2 oz)   BMI 29.03 kg/m²     Physical Exam  Vitals reviewed.   Constitutional:       Appearance: Normal appearance.   Neck:      Vascular: No carotid bruit.   Cardiovascular:      Rate and Rhythm: Normal rate and regular rhythm.      Pulses: Normal pulses.      Heart sounds: Normal heart sounds.   Pulmonary:      Effort: Pulmonary effort is normal. No respiratory distress.      Breath sounds: Normal breath sounds. No wheezing.   Abdominal:      General: There is no distension.      Palpations: Abdomen is soft. There is no mass.      Tenderness: There is no abdominal tenderness. There is no right CVA tenderness, left CVA tenderness, guarding or rebound.   Musculoskeletal:         General: Tenderness (at the base of the left fifth metatarsal) present.      Cervical back: Normal range of motion and neck supple. No rigidity.      Right lower leg: No edema.      Left lower leg: No edema.   Lymphadenopathy:      Cervical: No cervical adenopathy.   Neurological:      Mental Status: He is alert.       Labs pending 06/06/2023.       Assessment/Plan   Problem List Items Addressed This Visit          Circulatory    Longstanding persistent atrial fibrillation (CMS/HCC)    Current Assessment & Plan      Is stable, continue with current treatment.          Benign essential " hypertension - Primary    Current Assessment & Plan      Is well controlled, continue with current medications.           Atherosclerosis of both lower extremities with intermittent claudication (CMS/HCC)    Current Assessment & Plan      Is present and symptomatic, will need treatment.             Digestive    History of adenomatous polyp of colon    Current Assessment & Plan     Is clinically stable so we will continue with current medications and lab work to confirm status ordered.             Musculoskeletal    Intermittent claudication (CMS/HCC)    Current Assessment & Plan      Is stable, continue with current treatment.          Arthritis    Current Assessment & Plan      Is present and symptomatic, will need treatment.             Other    Mixed hyperlipidemia    Current Assessment & Plan     Is clinically stable so we will continue with current medications and lab work to confirm status ordered.             Follow up in: 3 months or sooner if needed with labs prior.     Scribe Attestation  By signing my name below, IVa Scribe   attest that this documentation has been prepared under the direction and in the presence of Juan Ramon De Guzman MD.

## 2023-06-06 ENCOUNTER — OFFICE VISIT (OUTPATIENT)
Dept: PRIMARY CARE | Facility: CLINIC | Age: 73
End: 2023-06-06
Payer: MEDICARE

## 2023-06-06 ENCOUNTER — LAB (OUTPATIENT)
Dept: LAB | Facility: LAB | Age: 73
End: 2023-06-06
Payer: MEDICARE

## 2023-06-06 VITALS
WEIGHT: 205.2 LBS | HEIGHT: 71 IN | HEART RATE: 78 BPM | DIASTOLIC BLOOD PRESSURE: 66 MMHG | RESPIRATION RATE: 18 BRPM | BODY MASS INDEX: 28.73 KG/M2 | TEMPERATURE: 98.4 F | SYSTOLIC BLOOD PRESSURE: 118 MMHG

## 2023-06-06 DIAGNOSIS — I70.213 ATHEROSCLEROSIS OF NATIVE ARTERY OF BOTH LOWER EXTREMITIES WITH INTERMITTENT CLAUDICATION (CMS-HCC): ICD-10-CM

## 2023-06-06 DIAGNOSIS — I48.11 LONGSTANDING PERSISTENT ATRIAL FIBRILLATION (MULTI): ICD-10-CM

## 2023-06-06 DIAGNOSIS — Z86.010 HISTORY OF ADENOMATOUS POLYP OF COLON: ICD-10-CM

## 2023-06-06 DIAGNOSIS — E78.2 MIXED HYPERLIPIDEMIA: ICD-10-CM

## 2023-06-06 DIAGNOSIS — I10 BENIGN ESSENTIAL HYPERTENSION: ICD-10-CM

## 2023-06-06 DIAGNOSIS — M19.90 ARTHRITIS: ICD-10-CM

## 2023-06-06 DIAGNOSIS — I10 BENIGN ESSENTIAL HYPERTENSION: Primary | ICD-10-CM

## 2023-06-06 DIAGNOSIS — I73.9 INTERMITTENT CLAUDICATION (CMS-HCC): ICD-10-CM

## 2023-06-06 LAB
ALANINE AMINOTRANSFERASE (SGPT) (U/L) IN SER/PLAS: 18 U/L (ref 10–52)
ALBUMIN (G/DL) IN SER/PLAS: 4.4 G/DL (ref 3.4–5)
ALKALINE PHOSPHATASE (U/L) IN SER/PLAS: 55 U/L (ref 33–136)
ANION GAP IN SER/PLAS: 13 MMOL/L (ref 10–20)
ASPARTATE AMINOTRANSFERASE (SGOT) (U/L) IN SER/PLAS: 19 U/L (ref 9–39)
BASOPHILS (10*3/UL) IN BLOOD BY AUTOMATED COUNT: 0.09 X10E9/L (ref 0–0.1)
BASOPHILS/100 LEUKOCYTES IN BLOOD BY AUTOMATED COUNT: 1.4 % (ref 0–2)
BILIRUBIN TOTAL (MG/DL) IN SER/PLAS: 0.6 MG/DL (ref 0–1.2)
CALCIUM (MG/DL) IN SER/PLAS: 9.6 MG/DL (ref 8.6–10.3)
CARBON DIOXIDE, TOTAL (MMOL/L) IN SER/PLAS: 28 MMOL/L (ref 21–32)
CHLORIDE (MMOL/L) IN SER/PLAS: 102 MMOL/L (ref 98–107)
CHOLESTEROL (MG/DL) IN SER/PLAS: 155 MG/DL (ref 0–199)
CHOLESTEROL IN HDL (MG/DL) IN SER/PLAS: 58.9 MG/DL
CHOLESTEROL/HDL RATIO: 2.6
CREATININE (MG/DL) IN SER/PLAS: 0.98 MG/DL (ref 0.5–1.3)
EOSINOPHILS (10*3/UL) IN BLOOD BY AUTOMATED COUNT: 0.18 X10E9/L (ref 0–0.4)
EOSINOPHILS/100 LEUKOCYTES IN BLOOD BY AUTOMATED COUNT: 2.8 % (ref 0–6)
ERYTHROCYTE DISTRIBUTION WIDTH (RATIO) BY AUTOMATED COUNT: 12.7 % (ref 11.5–14.5)
ERYTHROCYTE MEAN CORPUSCULAR HEMOGLOBIN CONCENTRATION (G/DL) BY AUTOMATED: 31.3 G/DL (ref 32–36)
ERYTHROCYTE MEAN CORPUSCULAR VOLUME (FL) BY AUTOMATED COUNT: 116 FL (ref 80–100)
ERYTHROCYTES (10*6/UL) IN BLOOD BY AUTOMATED COUNT: 4.01 X10E12/L (ref 4.5–5.9)
GFR MALE: 81 ML/MIN/1.73M2
GLUCOSE (MG/DL) IN SER/PLAS: 114 MG/DL (ref 74–99)
HEMATOCRIT (%) IN BLOOD BY AUTOMATED COUNT: 46.4 % (ref 41–52)
HEMOGLOBIN (G/DL) IN BLOOD: 14.5 G/DL (ref 13.5–17.5)
IMMATURE GRANULOCYTES/100 LEUKOCYTES IN BLOOD BY AUTOMATED COUNT: 0.9 % (ref 0–0.9)
LDL: 67 MG/DL (ref 0–99)
LEUKOCYTES (10*3/UL) IN BLOOD BY AUTOMATED COUNT: 6.5 X10E9/L (ref 4.4–11.3)
LYMPHOCYTES (10*3/UL) IN BLOOD BY AUTOMATED COUNT: 2.09 X10E9/L (ref 0.8–3)
LYMPHOCYTES/100 LEUKOCYTES IN BLOOD BY AUTOMATED COUNT: 32.4 % (ref 13–44)
MAGNESIUM (MG/DL) IN SER/PLAS: 2.14 MG/DL (ref 1.6–2.4)
MONOCYTES (10*3/UL) IN BLOOD BY AUTOMATED COUNT: 0.85 X10E9/L (ref 0.05–0.8)
MONOCYTES/100 LEUKOCYTES IN BLOOD BY AUTOMATED COUNT: 13.2 % (ref 2–10)
NEUTROPHILS (10*3/UL) IN BLOOD BY AUTOMATED COUNT: 3.18 X10E9/L (ref 1.6–5.5)
NEUTROPHILS/100 LEUKOCYTES IN BLOOD BY AUTOMATED COUNT: 49.3 % (ref 40–80)
PLATELETS (10*3/UL) IN BLOOD AUTOMATED COUNT: 179 X10E9/L (ref 150–450)
POTASSIUM (MMOL/L) IN SER/PLAS: 5.1 MMOL/L (ref 3.5–5.3)
PROTEIN TOTAL: 7 G/DL (ref 6.4–8.2)
SODIUM (MMOL/L) IN SER/PLAS: 138 MMOL/L (ref 136–145)
TRIGLYCERIDE (MG/DL) IN SER/PLAS: 146 MG/DL (ref 0–149)
UREA NITROGEN (MG/DL) IN SER/PLAS: 20 MG/DL (ref 6–23)
VLDL: 29 MG/DL (ref 0–40)

## 2023-06-06 PROCEDURE — 85060 BLOOD SMEAR INTERPRETATION: CPT | Performed by: PATHOLOGY

## 2023-06-06 PROCEDURE — 3074F SYST BP LT 130 MM HG: CPT | Performed by: FAMILY MEDICINE

## 2023-06-06 PROCEDURE — 36415 COLL VENOUS BLD VENIPUNCTURE: CPT

## 2023-06-06 PROCEDURE — 3078F DIAST BP <80 MM HG: CPT | Performed by: FAMILY MEDICINE

## 2023-06-06 PROCEDURE — 83735 ASSAY OF MAGNESIUM: CPT

## 2023-06-06 PROCEDURE — 1159F MED LIST DOCD IN RCRD: CPT | Performed by: FAMILY MEDICINE

## 2023-06-06 PROCEDURE — 80053 COMPREHEN METABOLIC PANEL: CPT

## 2023-06-06 PROCEDURE — 85025 COMPLETE CBC W/AUTO DIFF WBC: CPT

## 2023-06-06 PROCEDURE — 80061 LIPID PANEL: CPT

## 2023-06-06 PROCEDURE — 1157F ADVNC CARE PLAN IN RCRD: CPT | Performed by: FAMILY MEDICINE

## 2023-06-06 PROCEDURE — 99214 OFFICE O/P EST MOD 30 MIN: CPT | Performed by: FAMILY MEDICINE

## 2023-06-06 PROCEDURE — 1036F TOBACCO NON-USER: CPT | Performed by: FAMILY MEDICINE

## 2023-06-06 RX ORDER — GABAPENTIN 100 MG/1
100 CAPSULE ORAL NIGHTLY
Qty: 30 CAPSULE | Refills: 3 | Status: SHIPPED | OUTPATIENT
Start: 2023-06-06 | End: 2023-09-07 | Stop reason: SDUPTHER

## 2023-06-06 ASSESSMENT — ENCOUNTER SYMPTOMS
DEPRESSION: 0
OCCASIONAL FEELINGS OF UNSTEADINESS: 0
LOSS OF SENSATION IN FEET: 1

## 2023-06-14 LAB — CBC DIFFERENTIAL PATH REVIEW: NORMAL

## 2023-06-15 DIAGNOSIS — E78.2 MIXED HYPERLIPIDEMIA: ICD-10-CM

## 2023-06-15 DIAGNOSIS — R71.8: ICD-10-CM

## 2023-06-15 DIAGNOSIS — I10 BENIGN ESSENTIAL HYPERTENSION: Primary | ICD-10-CM

## 2023-08-24 ENCOUNTER — OFFICE VISIT (OUTPATIENT)
Dept: PAIN MANAGEMENT | Age: 73
End: 2023-08-24
Payer: MEDICARE

## 2023-08-24 VITALS
WEIGHT: 210 LBS | BODY MASS INDEX: 30.06 KG/M2 | SYSTOLIC BLOOD PRESSURE: 138 MMHG | DIASTOLIC BLOOD PRESSURE: 74 MMHG | HEIGHT: 70 IN

## 2023-08-24 DIAGNOSIS — M47.817 LUMBOSACRAL SPONDYLOSIS WITHOUT MYELOPATHY: ICD-10-CM

## 2023-08-24 DIAGNOSIS — M47.812 CERVICAL SPONDYLOSIS WITHOUT MYELOPATHY: Primary | ICD-10-CM

## 2023-08-24 PROCEDURE — 1036F TOBACCO NON-USER: CPT | Performed by: PAIN MEDICINE

## 2023-08-24 PROCEDURE — 3017F COLORECTAL CA SCREEN DOC REV: CPT | Performed by: PAIN MEDICINE

## 2023-08-24 PROCEDURE — G8417 CALC BMI ABV UP PARAM F/U: HCPCS | Performed by: PAIN MEDICINE

## 2023-08-24 PROCEDURE — 99213 OFFICE O/P EST LOW 20 MIN: CPT | Performed by: PAIN MEDICINE

## 2023-08-24 PROCEDURE — G8427 DOCREV CUR MEDS BY ELIG CLIN: HCPCS | Performed by: PAIN MEDICINE

## 2023-08-24 PROCEDURE — 1123F ACP DISCUSS/DSCN MKR DOCD: CPT | Performed by: PAIN MEDICINE

## 2023-08-24 RX ORDER — GABAPENTIN 100 MG/1
CAPSULE ORAL
COMMUNITY
Start: 2023-06-06 | End: 2023-10-04

## 2023-08-24 NOTE — PROGRESS NOTES
[facility]) Physicians  Neurosurgery and Pain [facility]  [address] [facility], [address] [place] [address] [place]: [phone]  F: [phone]        Alberto Garcia  ([birth year])    8/24/2023    Subjective:     Alberto Garcia is 68 y.o. male who complains today of:     Chief Complaint   Patient presents with    Back Pain     Patient here today for follow-up. He has back pain on both sides worse with yard work bending. History of left-sided lower ablation. History of cervical ablation. He has arthritis chronic pain. Recently achy sharp pain it varies his spasms. He is done physical therapy in the past.    Plan: Discussed options. We will set up for bilateral 2, 3 medial branch blocks with steroid. He has failed conservative treatment. Continue home excise program.  Chart was reviewed questions answered. He is in agreement with plan. Allergies:  Patient has no known allergies. No past medical history on file. No past surgical history on file. No family history on file. Social History     Socioeconomic History    Marital status:      Spouse name: Not on file    Number of children: Not on file    Years of education: Not on file    Highest education level: Not on file   Occupational History    Not on file   Tobacco Use    Smoking status: Former    Smokeless tobacco: Never   Substance and Sexual Activity    Alcohol use:  Yes     Alcohol/week: 9.0 standard drinks     Types: 9 Glasses of wine per week    Drug use: Not on file    Sexual activity: Not on file   Other Topics Concern    Not on file   Social History Narrative    Not on file     Social Determinants of Health     Financial Resource Strain: Not on file   Food Insecurity: Not on file   Transportation Needs: Not on file   Physical Activity: Not on file   Stress: Not on file   Social Connections: Not on file   Intimate Partner Violence: Not on file   Housing Stability: Not on file       Current Outpatient

## 2023-08-26 PROBLEM — I73.9 PAD (PERIPHERAL ARTERY DISEASE) (CMS-HCC): Status: ACTIVE | Noted: 2023-08-26

## 2023-08-26 PROBLEM — I49.5 SINUS NODE DYSFUNCTION (MULTI): Status: ACTIVE | Noted: 2023-08-26

## 2023-08-29 ENCOUNTER — PROCEDURE VISIT (OUTPATIENT)
Dept: PAIN MANAGEMENT | Age: 73
End: 2023-08-29
Payer: MEDICARE

## 2023-08-29 DIAGNOSIS — M47.817 LUMBOSACRAL SPONDYLOSIS WITHOUT MYELOPATHY: ICD-10-CM

## 2023-08-29 PROCEDURE — 64493 INJ PARAVERT F JNT L/S 1 LEV: CPT | Performed by: PAIN MEDICINE

## 2023-08-29 RX ORDER — LIDOCAINE HYDROCHLORIDE 10 MG/ML
10 INJECTION, SOLUTION EPIDURAL; INFILTRATION; INTRACAUDAL; PERINEURAL ONCE
Status: COMPLETED | OUTPATIENT
Start: 2023-08-29 | End: 2023-08-29

## 2023-08-29 RX ORDER — BETAMETHASONE SODIUM PHOSPHATE AND BETAMETHASONE ACETATE 3; 3 MG/ML; MG/ML
6 INJECTION, SUSPENSION INTRA-ARTICULAR; INTRALESIONAL; INTRAMUSCULAR; SOFT TISSUE ONCE
Status: COMPLETED | OUTPATIENT
Start: 2023-08-29 | End: 2023-08-29

## 2023-08-29 RX ADMIN — LIDOCAINE HYDROCHLORIDE 10 MG: 10 INJECTION, SOLUTION EPIDURAL; INFILTRATION; INTRACAUDAL; PERINEURAL at 09:55

## 2023-08-29 RX ADMIN — BETAMETHASONE SODIUM PHOSPHATE AND BETAMETHASONE ACETATE 6 MG: 3; 3 INJECTION, SUSPENSION INTRA-ARTICULAR; INTRALESIONAL; INTRAMUSCULAR; SOFT TISSUE at 09:55

## 2023-09-04 NOTE — PROGRESS NOTES
"Subjective    Patient ID: Gutierrez Aldana is a 73 y.o. male who presents for COPD.     The patient is compliant with medications. Patient denies any side effects to the medications.      Hypertension: The patient is here for follow-up of elevated blood pressure. He is adherent to a low salt diet. Blood pressure is well controlled at home. No new myalgias or GI upset on diet control.    COPD: The patient presents for a follow up of COPD. He reports that his breathing is stable, along with his wheezing.     Arthritis: The patient is present today for a follow up of arthritis. The patient states that their pain is located in multiple joints. Pt reports that the gabapentin is helping alleviate his pain, numbness and tingling.       The patient denies having the following symptoms: chest pain, chest pressure, fever, chills, N/V/D, constipation, dizziness, headaches, SOB.    Objective   Vitals:  /68   Pulse 82   Temp 36.3 °C (97.4 °F)   Resp 14   Ht 1.791 m (5' 10.5\")   Wt 92.5 kg (204 lb)   SpO2 99%   BMI 28.86 kg/m²     Physical Exam  Vitals reviewed.   Constitutional:       Appearance: Normal appearance.   Neck:      Vascular: No carotid bruit.   Cardiovascular:      Rate and Rhythm: Normal rate and regular rhythm.      Pulses: Normal pulses.      Heart sounds: Normal heart sounds.   Pulmonary:      Effort: Pulmonary effort is normal. No respiratory distress.      Breath sounds: Normal breath sounds. No wheezing.   Abdominal:      General: There is no distension.      Palpations: Abdomen is soft. There is no mass.      Tenderness: There is no abdominal tenderness. There is no right CVA tenderness, left CVA tenderness, guarding or rebound.   Musculoskeletal:      Cervical back: Normal range of motion and neck supple. No rigidity.      Right lower leg: No edema.      Left lower leg: No edema.   Lymphadenopathy:      Cervical: No cervical adenopathy.   Neurological:      Mental Status: He is alert.      "       Labs active- future.     Assessment/Plan   Problem List Items Addressed This Visit       Chronic obstructive pulmonary disease (CMS/HCC) - Primary      Is stable, continue with current treatment.          Benign essential hypertension      Is stable, continue with current treatment.          Relevant Orders    Follow Up In [facility] - PCP - Medicare Annual    Arthritis      Is well controlled, continue with current medications.           Relevant Medications    gabapentin (Neurontin) 100 mg capsule     Other Visit Diagnoses       Need for influenza vaccination        Relevant Orders    Flu vaccine, quadrivalent, high-dose, preservative free, age 65y+ (FLUZONE) (Completed)            Follow up in: 3 month(s) + AWV or sooner if needed with labs prior.     Scribe Attestation  By signing my name below, I, Rajeev Evans   attest that this documentation has been prepared under the direction and in the presence of Juan Ramon De Guzman MD.

## 2023-09-07 ENCOUNTER — OFFICE VISIT (OUTPATIENT)
Dept: PRIMARY CARE | Facility: CLINIC | Age: 73
End: 2023-09-07
Payer: MEDICARE

## 2023-09-07 VITALS
RESPIRATION RATE: 14 BRPM | DIASTOLIC BLOOD PRESSURE: 68 MMHG | HEIGHT: 71 IN | WEIGHT: 204 LBS | OXYGEN SATURATION: 99 % | SYSTOLIC BLOOD PRESSURE: 126 MMHG | TEMPERATURE: 97.4 F | HEART RATE: 82 BPM | BODY MASS INDEX: 28.56 KG/M2

## 2023-09-07 DIAGNOSIS — M19.90 ARTHRITIS: ICD-10-CM

## 2023-09-07 DIAGNOSIS — J43.1 PANLOBULAR EMPHYSEMA (MULTI): Primary | ICD-10-CM

## 2023-09-07 DIAGNOSIS — I10 BENIGN ESSENTIAL HYPERTENSION: ICD-10-CM

## 2023-09-07 DIAGNOSIS — Z23 NEED FOR INFLUENZA VACCINATION: ICD-10-CM

## 2023-09-07 PROBLEM — Z98.61 CAD S/P PERCUTANEOUS CORONARY ANGIOPLASTY: Status: RESOLVED | Noted: 2023-06-03 | Resolved: 2023-09-07

## 2023-09-07 PROBLEM — Z86.0101 HISTORY OF ADENOMATOUS POLYP OF COLON: Status: RESOLVED | Noted: 2023-06-03 | Resolved: 2023-09-07

## 2023-09-07 PROBLEM — Z86.010 HISTORY OF ADENOMATOUS POLYP OF COLON: Status: RESOLVED | Noted: 2023-06-03 | Resolved: 2023-09-07

## 2023-09-07 PROBLEM — I25.10 CAD S/P PERCUTANEOUS CORONARY ANGIOPLASTY: Status: RESOLVED | Noted: 2023-06-03 | Resolved: 2023-09-07

## 2023-09-07 PROCEDURE — G0008 ADMIN INFLUENZA VIRUS VAC: HCPCS | Performed by: FAMILY MEDICINE

## 2023-09-07 PROCEDURE — 90662 IIV NO PRSV INCREASED AG IM: CPT | Performed by: FAMILY MEDICINE

## 2023-09-07 PROCEDURE — 3078F DIAST BP <80 MM HG: CPT | Performed by: FAMILY MEDICINE

## 2023-09-07 PROCEDURE — 1159F MED LIST DOCD IN RCRD: CPT | Performed by: FAMILY MEDICINE

## 2023-09-07 PROCEDURE — 1036F TOBACCO NON-USER: CPT | Performed by: FAMILY MEDICINE

## 2023-09-07 PROCEDURE — 3074F SYST BP LT 130 MM HG: CPT | Performed by: FAMILY MEDICINE

## 2023-09-07 PROCEDURE — 99213 OFFICE O/P EST LOW 20 MIN: CPT | Performed by: FAMILY MEDICINE

## 2023-09-07 PROCEDURE — 1157F ADVNC CARE PLAN IN RCRD: CPT | Performed by: FAMILY MEDICINE

## 2023-09-07 RX ORDER — GABAPENTIN 100 MG/1
100 CAPSULE ORAL NIGHTLY
Qty: 90 CAPSULE | Refills: 1 | Status: SHIPPED | OUTPATIENT
Start: 2023-09-07 | End: 2024-03-14

## 2023-10-03 ENCOUNTER — ANESTHESIA EVENT (OUTPATIENT)
Dept: GASTROENTEROLOGY | Facility: HOSPITAL | Age: 73
End: 2023-10-03
Payer: MEDICARE

## 2023-10-03 ENCOUNTER — CLINICAL SUPPORT (OUTPATIENT)
Dept: PREADMISSION TESTING | Facility: HOSPITAL | Age: 73
End: 2023-10-03
Payer: MEDICARE

## 2023-10-03 VITALS — HEIGHT: 70 IN | WEIGHT: 198 LBS | BODY MASS INDEX: 28.35 KG/M2

## 2023-10-03 NOTE — PREPROCEDURE INSTRUCTIONS
Called left  for medical records at [facility] regarding EKG that was done at Dr. Torres cardiology visit on 8/8/23. Request for EKG to be faxed to PAT office.

## 2023-10-04 ENCOUNTER — HOSPITAL ENCOUNTER (OUTPATIENT)
Dept: CARDIOLOGY | Facility: HOSPITAL | Age: 73
Discharge: HOME | End: 2023-10-04
Payer: MEDICARE

## 2023-10-04 ENCOUNTER — HOSPITAL ENCOUNTER (OUTPATIENT)
Dept: GASTROENTEROLOGY | Facility: HOSPITAL | Age: 73
Discharge: HOME | End: 2023-10-04
Payer: MEDICARE

## 2023-10-04 ENCOUNTER — ANESTHESIA (OUTPATIENT)
Dept: GASTROENTEROLOGY | Facility: HOSPITAL | Age: 73
End: 2023-10-04
Payer: MEDICARE

## 2023-10-04 VITALS
HEART RATE: 83 BPM | WEIGHT: 195.11 LBS | RESPIRATION RATE: 16 BRPM | SYSTOLIC BLOOD PRESSURE: 92 MMHG | BODY MASS INDEX: 27.93 KG/M2 | OXYGEN SATURATION: 99 % | DIASTOLIC BLOOD PRESSURE: 55 MMHG | HEIGHT: 70 IN | TEMPERATURE: 96.8 F

## 2023-10-04 DIAGNOSIS — Z12.11 ENCOUNTER FOR SCREENING FOR MALIGNANT NEOPLASM OF COLON: ICD-10-CM

## 2023-10-04 DIAGNOSIS — Z86.010 PERSONAL HISTORY OF COLONIC POLYPS: ICD-10-CM

## 2023-10-04 PROBLEM — Z86.0100 PERSONAL HISTORY OF COLONIC POLYPS: Status: ACTIVE | Noted: 2023-06-03

## 2023-10-04 LAB
ATRIAL RATE: 63 BPM
GLUCOSE BLD MANUAL STRIP-MCNC: 119 MG/DL (ref 74–99)
P AXIS: 1 DEGREES
P OFFSET: 169 MS
P ONSET: 112 MS
PR INTERVAL: 204 MS
Q ONSET: 214 MS
QRS COUNT: 11 BEATS
QRS DURATION: 84 MS
QT INTERVAL: 422 MS
QTC CALCULATION(BAZETT): 431 MS
QTC FREDERICIA: 429 MS
R AXIS: 44 DEGREES
T AXIS: 58 DEGREES
T OFFSET: 425 MS
VENTRICULAR RATE: 63 BPM

## 2023-10-04 PROCEDURE — 45385 COLONOSCOPY W/LESION REMOVAL: CPT | Performed by: SURGERY

## 2023-10-04 PROCEDURE — 2500000004 HC RX 250 GENERAL PHARMACY W/ HCPCS (ALT 636 FOR OP/ED): Performed by: NURSE ANESTHETIST, CERTIFIED REGISTERED

## 2023-10-04 PROCEDURE — 88305 TISSUE EXAM BY PATHOLOGIST: CPT | Mod: TC,SUR,CMCLAB,ELYLAB | Performed by: SURGERY

## 2023-10-04 PROCEDURE — 2580000001 HC RX 258 IV SOLUTIONS: Performed by: SURGERY

## 2023-10-04 PROCEDURE — 2500000005 HC RX 250 GENERAL PHARMACY W/O HCPCS: Performed by: NURSE ANESTHETIST, CERTIFIED REGISTERED

## 2023-10-04 PROCEDURE — 2720000007 HC OR 272 NO HCPCS: Performed by: SURGERY

## 2023-10-04 PROCEDURE — 3700000001 HC GENERAL ANESTHESIA TIME - INITIAL BASE CHARGE: Performed by: SURGERY

## 2023-10-04 PROCEDURE — 7100000009 HC PHASE TWO TIME - INITIAL BASE CHARGE: Performed by: SURGERY

## 2023-10-04 PROCEDURE — 93010 ELECTROCARDIOGRAM REPORT: CPT | Performed by: INTERNAL MEDICINE

## 2023-10-04 PROCEDURE — 2500000004 HC RX 250 GENERAL PHARMACY W/ HCPCS (ALT 636 FOR OP/ED): Performed by: SURGERY

## 2023-10-04 PROCEDURE — 88305 TISSUE EXAM BY PATHOLOGIST: CPT | Performed by: PATHOLOGY

## 2023-10-04 PROCEDURE — 88305 TISSUE EXAM BY PATHOLOGIST: CPT | Mod: TC,ELYLAB,91 | Performed by: SURGERY

## 2023-10-04 PROCEDURE — 3700000002 HC GENERAL ANESTHESIA TIME - EACH INCREMENTAL 1 MINUTE: Performed by: SURGERY

## 2023-10-04 PROCEDURE — 93005 ELECTROCARDIOGRAM TRACING: CPT | Mod: 59

## 2023-10-04 PROCEDURE — 7100000010 HC PHASE TWO TIME - EACH INCREMENTAL 1 MINUTE: Performed by: SURGERY

## 2023-10-04 PROCEDURE — 82947 ASSAY GLUCOSE BLOOD QUANT: CPT

## 2023-10-04 RX ORDER — GLYCOPYRROLATE 0.2 MG/ML
INJECTION INTRAMUSCULAR; INTRAVENOUS AS NEEDED
Status: DISCONTINUED | OUTPATIENT
Start: 2023-10-04 | End: 2023-10-04

## 2023-10-04 RX ORDER — NORETHINDRONE AND ETHINYL ESTRADIOL 0.5-0.035
KIT ORAL AS NEEDED
Status: DISCONTINUED | OUTPATIENT
Start: 2023-10-04 | End: 2023-10-04

## 2023-10-04 RX ORDER — PHENYLEPHRINE HCL IN 0.9% NACL 1 MG/10 ML
SYRINGE (ML) INTRAVENOUS AS NEEDED
Status: DISCONTINUED | OUTPATIENT
Start: 2023-10-04 | End: 2023-10-04

## 2023-10-04 RX ORDER — PROPOFOL 10 MG/ML
INJECTION, EMULSION INTRAVENOUS AS NEEDED
Status: DISCONTINUED | OUTPATIENT
Start: 2023-10-04 | End: 2023-10-04

## 2023-10-04 RX ORDER — SODIUM CHLORIDE, SODIUM LACTATE, POTASSIUM CHLORIDE, CALCIUM CHLORIDE 600; 310; 30; 20 MG/100ML; MG/100ML; MG/100ML; MG/100ML
100 INJECTION, SOLUTION INTRAVENOUS CONTINUOUS
Status: DISCONTINUED | OUTPATIENT
Start: 2023-10-04 | End: 2023-10-20 | Stop reason: HOSPADM

## 2023-10-04 RX ADMIN — GLYCOPYRROLATE 0.4 MG: 0.2 INJECTION, SOLUTION INTRAMUSCULAR; INTRAVENOUS at 09:41

## 2023-10-04 RX ADMIN — PROPOFOL 50 MG: 10 INJECTION, EMULSION INTRAVENOUS at 09:33

## 2023-10-04 RX ADMIN — EPHEDRINE SULFATE 10 MG: 50 INJECTION, SOLUTION INTRAVENOUS at 10:02

## 2023-10-04 RX ADMIN — GLYCOPYRROLATE 0.4 MG: 0.2 INJECTION, SOLUTION INTRAMUSCULAR; INTRAVENOUS at 09:33

## 2023-10-04 RX ADMIN — PROPOFOL 50 MG: 10 INJECTION, EMULSION INTRAVENOUS at 09:41

## 2023-10-04 RX ADMIN — SODIUM CHLORIDE, SODIUM LACTATE, POTASSIUM CHLORIDE, AND CALCIUM CHLORIDE 100 ML/HR: 600; 310; 30; 20 INJECTION, SOLUTION INTRAVENOUS at 08:45

## 2023-10-04 RX ADMIN — PROPOFOL 50 MG: 10 INJECTION, EMULSION INTRAVENOUS at 09:38

## 2023-10-04 RX ADMIN — PROPOFOL 150 MG: 10 INJECTION, EMULSION INTRAVENOUS at 09:26

## 2023-10-04 RX ADMIN — Medication 200 MCG: at 09:42

## 2023-10-04 RX ADMIN — PROPOFOL 50 MG: 10 INJECTION, EMULSION INTRAVENOUS at 09:35

## 2023-10-04 RX ADMIN — PROPOFOL 50 MG: 10 INJECTION, EMULSION INTRAVENOUS at 09:44

## 2023-10-04 SDOH — HEALTH STABILITY: MENTAL HEALTH: CURRENT SMOKER: 0

## 2023-10-04 ASSESSMENT — PAIN SCALES - GENERAL
PAINLEVEL_OUTOF10: 0 - NO PAIN

## 2023-10-04 ASSESSMENT — COLUMBIA-SUICIDE SEVERITY RATING SCALE - C-SSRS
1. IN THE PAST MONTH, HAVE YOU WISHED YOU WERE DEAD OR WISHED YOU COULD GO TO SLEEP AND NOT WAKE UP?: NO
6. HAVE YOU EVER DONE ANYTHING, STARTED TO DO ANYTHING, OR PREPARED TO DO ANYTHING TO END YOUR LIFE?: NO
2. HAVE YOU ACTUALLY HAD ANY THOUGHTS OF KILLING YOURSELF?: NO

## 2023-10-04 ASSESSMENT — PAIN - FUNCTIONAL ASSESSMENT
PAIN_FUNCTIONAL_ASSESSMENT: 0-10
PAIN_FUNCTIONAL_ASSESSMENT: 0-10

## 2023-10-04 NOTE — Clinical Note
Patient tolerating procedure well and is comfortable with no complaints of pain. Vital signs stable.  Report to acc

## 2023-10-04 NOTE — DISCHARGE INSTRUCTIONS
Patient Instructions after a Colonoscopy      The anesthetics, sedatives or narcotics which were given to you today will be acting in your body for the next 24 hours, so you might feel a little sleepy or groggy.  This feeling should slowly wear off. Carefully read and follow the instructions.     You received sedation today:  - Do not drive or operate any machinery or power tools of any kind.   - No alcoholic beverages today, not even beer or wine.  - Do not make any important decisions or sign any legal documents.  - No over the counter medications that contain alcohol or that may cause drowsiness.  - Do not make any important decisions or sign any legal documents.    While it is common to experience mild to moderate abdominal distention, gas, or belching after your procedure, if any of these symptoms occur following discharge from the GI Lab or within one week of having your procedure, call the [facility] to be advised whether a visit to your nearest Urgent Care or Emergency Department is indicated.  Take this paper with you if you go.     - If you develop an allergic reaction to the medications that were given during your procedure such as difficulty breathing, rash, hives, severe nausea, vomiting or lightheadedness.  - If you experience chest pain, shortness of breath, severe abdominal pain, fevers and chills.  -If you develop signs and symptoms of bleeding such as blood in your spit, if your stools turn black, tarry, or bloody  - If you have not urinated within 8 hours following your procedure.  - If your IV site becomes painful, red, inflamed, or looks infected.    If you received a biopsy/polypectomy/sphincterotomy the following instructions apply below:    __ Do not use Aspirin containing products, non-steroidal medications or anti-coagulants for one week following your procedure. (Examples of these types of medications are: Advil, Arthrotec, Aleve, Coumadin, Ecotrin, Heparin, Ibuprofen,  Indocin, Motrin, Naprosyn, Nuprin, Plavix, Vioxx, and Voltarin, or their generic forms.  This list is not all-inclusive.  Check with your physician or pharmacist before resuming medications.)   __ Eat a soft diet today.  Avoid foods that are poorly digested for the next 24 hours.  These foods would include: nuts, beans, lettuce, red meats, and fried foods. Start with liquids and advance your diet as tolerated, gradually work up to eating solids.   __ Do not have a Barium Study or Enema for one week.    Your physician recommends the additional following instructions:    -You have a contact number available for emergencies. The signs and symptoms of potential delayed complications were discussed with you. You may return to normal activities tomorrow.  -Resume your previous diet.  -Continue your present medications.   -We are waiting for your pathology results.  -Your physician has recommended a repeat colonoscopy (date to be determined after pending pathology results are reviewed) for surveillance based on pathology results.  -The findings and recommendations have been discussed with you.  -The findings and recommendations were discussed with your family.  - Please see Medication Reconciliation Form for new medication/medications prescribed.

## 2023-10-04 NOTE — ANESTHESIA POSTPROCEDURE EVALUATION
Patient: Gutierrez Aldana    Procedure Summary       Date: 10/04/23 Room / Location: [facility]    Anesthesia Start: 0919 Anesthesia Stop:     Procedure: COLONOSCOPY Diagnosis:       Encounter for screening for malignant neoplasm of colon      Personal history of colonic polyps    Scheduled Providers: Laureano Og MD Responsible Provider: Aly Okeefe MD    Anesthesia Type: MAC ASA Status: 3            Anesthesia Type: MAC    Vitals Value Taken Time   /52 10/04/23 0951   Temp 36.5 10/04/23 0951   Pulse 70 10/04/23 0951   Resp 18 10/04/23 0951   SpO2 99 10/04/23 0951       Anesthesia Post Evaluation    Patient location during evaluation: PACU  Patient participation: complete - patient participated  Level of consciousness: awake and alert  Pain management: satisfactory to patient  Multimodal analgesia pain management approach  Airway patency: patent  Cardiovascular status: acceptable and blood pressure returned to baseline  Respiratory status: acceptable  Hydration status: acceptable        No notable events documented.

## 2023-10-04 NOTE — ANESTHESIA PREPROCEDURE EVALUATION
Patient: Gutierrez Aldana    Procedure Information       Date/Time: 10/04/23 0830    Scheduled providers: Laurenao Og MD    Procedure: COLONOSCOPY    Location: [facility] [place]            Relevant Problems   Cardiovascular   (+) Atherosclerosis of both lower extremities with intermittent claudication (CMS/HCC)   (+) Benign essential hypertension   (+) Longstanding persistent atrial fibrillation (CMS/HCC)   (+) Mixed hyperlipidemia   (+) PAD (peripheral artery disease) (CMS/HCC)   (+) Sinus node dysfunction (CMS/HCC)      Pulmonary   (+) Chronic obstructive pulmonary disease (CMS/HCC)      Other   (+) Arthritis       Clinical information reviewed:    Allergies                NPO Detail:  No data recorded     Physical Exam    Airway  Mallampati: II  TM distance: >3 FB  Neck ROM: full     Cardiovascular    Dental - normal exam     Pulmonary    Abdominal          Anesthesia Plan    ASA 3     MAC     The patient is not a current smoker.    intravenous induction   Anesthetic plan and risks discussed with patient.    Plan discussed with CRNA.

## 2023-10-04 NOTE — H&P
History Of Present Illness  Gutierrez Aldana is a 73 y.o. male here for surveillance colonoscopy; last colonoscopy was in August 2018; two tubular adenoma removed from descending colon. On Eliquis for Afib     Past Medical History  He has a past medical history of Acute upper respiratory infection, unspecified, Anesthesia of skin, Atrial fibrillation (CMS/HCC), CAD S/P percutaneous coronary angioplasty (06/03/2023), Diverticulosis of intestine, part unspecified, without perforation or abscess without bleeding (08/28/2018), History of adenomatous polyp of colon (06/03/2023), Other general symptoms and signs, Overweight (05/16/2022), Overweight (05/16/2022), Personal history of other benign neoplasm (08/28/2018), Personal history of other diseases of the circulatory system (07/06/2016), Personal history of other diseases of the circulatory system (07/06/2016), Personal history of other diseases of the musculoskeletal system and connective tissue, Personal history of other diseases of the musculoskeletal system and connective tissue, Personal history of other endocrine, nutritional and metabolic disease, Personal history of other endocrine, nutritional and metabolic disease, Personal history of other endocrine, nutritional and metabolic disease, Personal history of other specified conditions, Personal history of other specified conditions (02/17/2022), Radiculopathy, cervical region, Radiculopathy, lumbar region, Solitary pulmonary nodule, Spondylosis without myelopathy or radiculopathy, cervical region, Unspecified osteoarthritis, unspecified site, and Wears glasses.    Surgical History  He has a past surgical history that includes Cardiac catheterization; Colonoscopy (11/03/2021); Coronary angioplasty; Other surgical history (11/03/2021); Cardioversion; Coronary angioplasty with stent (05/07/2018); and CT angio aorta and bilateral iliofemoral runoff w and or wo IV contrast (08/17/2020).     Social History  He reports  that he has quit smoking. His smoking use included cigarettes. He has a 88.00 pack-year smoking history. He has never used smokeless tobacco. He reports current alcohol use of about 6.0 standard drinks of alcohol per week. He reports that he does not use drugs.    Family History  Family History   Problem Relation Name Age of Onset    Cancer Mother      Atrial fibrillation Sister      Other (CARDIAC PACEMAKER) Sister          Allergies  Patient has no known allergies.    Review of Systems     Physical Exam  Constitutional:       Appearance: Normal appearance.   Cardiovascular:      Rate and Rhythm: Normal rate.   Pulmonary:      Effort: Pulmonary effort is normal.   Abdominal:      General: Bowel sounds are normal.      Palpations: Abdomen is soft.   Musculoskeletal:         General: Normal range of motion.          Assessment/Plan   Here for surveillance colonoscopy; I explained to patient the procedure, risks and complications; all questions answered and willing to proceed    Laureano Og MD

## 2023-10-09 ENCOUNTER — OFFICE VISIT (OUTPATIENT)
Dept: CARDIOLOGY | Facility: CLINIC | Age: 73
End: 2023-10-09
Payer: MEDICARE

## 2023-10-09 VITALS
HEART RATE: 72 BPM | SYSTOLIC BLOOD PRESSURE: 156 MMHG | HEIGHT: 69 IN | DIASTOLIC BLOOD PRESSURE: 88 MMHG | WEIGHT: 207.8 LBS | BODY MASS INDEX: 30.78 KG/M2

## 2023-10-09 DIAGNOSIS — I73.9 PAD (PERIPHERAL ARTERY DISEASE) (CMS-HCC): ICD-10-CM

## 2023-10-09 DIAGNOSIS — I48.11 LONGSTANDING PERSISTENT ATRIAL FIBRILLATION (MULTI): ICD-10-CM

## 2023-10-09 DIAGNOSIS — I10 BENIGN ESSENTIAL HYPERTENSION: ICD-10-CM

## 2023-10-09 DIAGNOSIS — Z79.899 HIGH RISK MEDICATION USE: ICD-10-CM

## 2023-10-09 DIAGNOSIS — Z98.61 CAD S/P PERCUTANEOUS CORONARY ANGIOPLASTY: ICD-10-CM

## 2023-10-09 DIAGNOSIS — E78.2 MIXED HYPERLIPIDEMIA: ICD-10-CM

## 2023-10-09 DIAGNOSIS — I25.10 CAD S/P PERCUTANEOUS CORONARY ANGIOPLASTY: ICD-10-CM

## 2023-10-09 PROBLEM — E66.811 CLASS 1 OBESITY WITH BODY MASS INDEX (BMI) OF 30.0 TO 30.9 IN ADULT: Status: ACTIVE | Noted: 2023-10-09

## 2023-10-09 PROBLEM — E66.9 CLASS 1 OBESITY WITH BODY MASS INDEX (BMI) OF 30.0 TO 30.9 IN ADULT: Status: ACTIVE | Noted: 2023-10-09

## 2023-10-09 PROBLEM — Z87.891 FORMER SMOKER: Status: ACTIVE | Noted: 2023-10-09

## 2023-10-09 LAB
LABORATORY COMMENT REPORT: NORMAL
PATH REPORT.FINAL DX SPEC: NORMAL
PATH REPORT.GROSS SPEC: NORMAL
PATH REPORT.TOTAL CANCER: NORMAL

## 2023-10-09 PROCEDURE — 99214 OFFICE O/P EST MOD 30 MIN: CPT | Performed by: INTERNAL MEDICINE

## 2023-10-09 PROCEDURE — 1036F TOBACCO NON-USER: CPT | Performed by: INTERNAL MEDICINE

## 2023-10-09 PROCEDURE — 3079F DIAST BP 80-89 MM HG: CPT | Performed by: INTERNAL MEDICINE

## 2023-10-09 PROCEDURE — 3077F SYST BP >= 140 MM HG: CPT | Performed by: INTERNAL MEDICINE

## 2023-10-09 PROCEDURE — 1126F AMNT PAIN NOTED NONE PRSNT: CPT | Performed by: INTERNAL MEDICINE

## 2023-10-09 PROCEDURE — 1159F MED LIST DOCD IN RCRD: CPT | Performed by: INTERNAL MEDICINE

## 2023-10-09 RX ORDER — LISINOPRIL 20 MG/1
TABLET ORAL
Qty: 270 TABLET | Refills: 3 | Status: SHIPPED | OUTPATIENT
Start: 2023-10-09

## 2023-10-09 NOTE — PROGRESS NOTES
meds  Patient:  Gutierrez Aldana  YOB: 1950  MRN: [id number]       Chief Complaint/Active Symptoms:       Gutierrez Aldana is a 73 y.o. male who returns today for cardiac follow-up.  Denies any new or unusual symptoms.  Had recent perfusion scan and echo for which he is here to review.  Has a history of coronary heart disease atrial arrhythmias anticoagulation along with antiarrhythmic therapy in this regard he also has a history of pretension dyslipidemia chronic dyspnea sinus node dysfunction and former smoking he also has peripheral vascular disease.      Objective:     Vitals:    10/09/23 1249   BP: 156/88   Pulse: 72       Vitals:    10/09/23 1249   Weight: 94.3 kg (207 lb 12.8 oz)       Allergies:     No Known Allergies       Medications:     Current Outpatient Medications   Medication Instructions    acetaminophen (Tylenol) 325 mg capsule oral    apixaban (ELIQUIS) 5 mg, oral, 2 times daily    aspirin 81 mg EC tablet 1 tablet, oral, 2 times daily    dofetilide (Tikosyn) 250 mcg capsule 1 capsule, oral, 2 times daily    gabapentin (NEURONTIN) 100 mg, oral, Nightly    lisinopril 20 mg tablet 1 tablet, oral, 2 times daily    metoprolol tartrate (Lopressor) 25 mg tablet 1 tablet, oral, 2 times daily    nitroglycerin (NITROSTAT) 0.4 mg, sublingual, Every 5 min PRN    simvastatin (Zocor) 40 mg tablet 1 tablet, oral, Nightly    vit A,C and E-lutein-minerals (Ocuvite with Lutein) 300 mcg-200 mg-27 mg-2 mg tablet 1 tablet, oral, Daily       Physical Examination:   Constitutional:       Appearance: Healthy appearance. Not in distress.   Neck:      Vascular: No JVR. JVD normal.   Pulmonary:      Effort: Pulmonary effort is normal.      Breath sounds: Normal breath sounds. No wheezing. No rhonchi. No rales.   Chest:      Chest wall: Not tender to palpatation.   Cardiovascular:      PMI at left midclavicular line. Normal rate. Regular rhythm. Normal S1. Normal S2.       Murmurs: There is no murmur.      No  "gallop.  No click. No rub.   Pulses:     Intact distal pulses.   Edema:     Peripheral edema absent.   Abdominal:      General: Bowel sounds are normal.      Palpations: Abdomen is soft.      Tenderness: There is no abdominal tenderness.   Musculoskeletal: Normal range of motion.         General: No tenderness. Skin:     General: Skin is warm and dry.   Neurological:      General: No focal deficit present.      Mental Status: Alert and oriented to person, place and time.            Lab:     CBC:   Lab Results   Component Value Date    WBC 6.5 06/06/2023    RBC 4.01 (L) 06/06/2023    HGB 14.5 06/06/2023    HCT 46.4 06/06/2023     06/06/2023        CMP:    Lab Results   Component Value Date     06/06/2023    K 5.1 06/06/2023     06/06/2023    CO2 28 06/06/2023    BUN 20 06/06/2023    CREATININE 0.98 06/06/2023    GLUCOSE 114 (H) 06/06/2023    CALCIUM 9.6 06/06/2023       Magnesium:    Lab Results   Component Value Date    MG 2.14 06/06/2023       Lipid Profile:    Lab Results   Component Value Date    TRIG 146 06/06/2023    HDL 58.9 06/06/2023       TSH:    No results found for: \"TSH\"    BNP:   Lab Results   Component Value Date     (H) 10/15/2022        PT/INR:    Lab Results   Component Value Date    PROTIME 13.9 (H) 10/15/2022    INR 1.2 (H) 10/15/2022       HgBA1c:    No results found for: \"HGBA1C\"    BMP:  Lab Results   Component Value Date     06/06/2023     10/15/2022     02/21/2022    K 5.1 06/06/2023    K 4.2 10/15/2022    K 4.3 02/21/2022     06/06/2023     10/15/2022     02/21/2022    CO2 28 06/06/2023    CO2 24 10/15/2022    CO2 31 02/21/2022    BUN 20 06/06/2023    BUN 18 10/15/2022    BUN 13 02/21/2022    CREATININE 0.98 06/06/2023    CREATININE 0.99 10/15/2022    CREATININE 1.15 02/21/2022       CBC:  Lab Results   Component Value Date    WBC 6.5 06/06/2023    WBC 9.6 10/15/2022    WBC 5.2 09/08/2020    RBC 4.01 (L) 06/06/2023    RBC 3.83 (L) " 10/15/2022    RBC 3.73 (L) 09/08/2020    HGB 14.5 06/06/2023    HGB 13.7 10/15/2022    HGB 13.5 09/08/2020    HCT 46.4 06/06/2023    HCT 43.3 10/15/2022    HCT 42.2 09/08/2020     (H) 06/06/2023     (H) 10/15/2022     (H) 09/08/2020    MCHC 31.3 (L) 06/06/2023    MCHC 31.6 (L) 10/15/2022    MCHC 32.0 09/08/2020    RDW 12.7 06/06/2023    RDW 13.0 10/15/2022    RDW 12.9 09/08/2020     06/06/2023     10/15/2022     09/08/2020       Cardiac Enzymes:    Lab Results   Component Value Date    TROPHS 5 10/15/2022    TROPHS 4 10/15/2022       Hepatic Function Panel:    Lab Results   Component Value Date    ALKPHOS 55 06/06/2023    ALT 18 06/06/2023    AST 19 06/06/2023    PROT 7.0 06/06/2023    BILITOT 0.6 06/06/2023         Diagnostic Studies:     ECG 12 lead    Result Date: 10/4/2023  Normal sinus rhythm Normal ECG When compared with ECG of 15-OCT-2022 18:10, Previous ECG has undetermined rhythm, needs review Confirmed by Deena Dennis ([id number]) on 10/4/2023 8:23:40 PM    Colonoscopy Screening    Result Date: 10/4/2023  Table formatting from the original result was not included. Impression Localized diverticulosis of moderate severity in the sigmoid colon Medium (grade 3) hemorrhoids Subcentimeter polyp in the ascending colon was removed with cold snare The ileocecal valve appeared normal. The cecum appeared normal. The ascending colon appeared normal. The transverse colon appeared normal. The descending colon appeared normal. Findings Multiple medium and large localized diverticula of moderate severity in the sigmoid colon Internal medium (grade 3) hemorrhoids observed during retroflexion Sessile polyp measuring smaller than 5 mm in the ascending colon; performed cold snare with complete en bloc removal and retrieved specimen The ileocecal valve appeared normal. The cecum appeared normal. The ascending colon appeared normal. The transverse colon appeared normal. The descending colon  appeared normal. Recommendation Repeat colonoscopy in 3 yrs due to inadequate bowel prep Indication Personal history of colonic polyps, Encounter for screening for malignant neoplasm of colon Post Procedure Diagnosis Diverticulosis, hemorrhoids and ascending colon polyp Staff Staff Role Sanjana Newman Endo Technician Ashley Del Toro RN Endo Nurse Medications See Anesthesia Record. Preprocedure A history and physical has been performed, and patient medication allergies have been reviewed. The patient's tolerance of previous anesthesia has been reviewed. The risks and benefits of the procedure and the sedation options and risks were discussed with the patient. All questions were answered and informed consent obtained. Details of the Procedure The patient underwent monitored anesthesia care, which was administered by an anesthesia professional. The patient's blood pressure, heart rate, level of consciousness, respirations, oxygen, ECG and ETCO2 were monitored throughout the procedure. A digital rectal exam was performed. The scope was introduced through the anus and advanced to the cecum. Retroflexion was performed in the rectum. Bowel prep was not adequate. The patient experienced no blood loss. The procedure was moderately difficult due to inadequate bowel prep of right colon due to loose stool coating the mucosa preventing adequate evaluation even after irrigation. The patient tolerated the procedure well. There were no apparent adverse events. Events Procedure Events Event Event Time ENDO SCOPE IN TIME 10/4/2023  9:27 AM ENDO CECUM REACHED 10/4/2023  9:31 AM ENDO SCOPE OUT TIME 10/4/2023  9:45 AM Specimens ID Type Source Tests Collected by Time 1 : ascending colon polyp Tissue ASCENDING COLON POLYP SURGICAL PATHOLOGY EXAM Laureano Og MD 10/4/2023 0931 Procedure Location [place] [address] [facility] [id number] [phone] Referring Provider Laureano Og Md [address] [address] [place] [address] Procedure Provider Laureano Og MD      EKG:   No results found for: \"EKG\"      Radiology:   Interpreted By:  CHONG ALBRIGHT MD  MRN: [id number]  Patient Name: ERICA THOMPSON     STUDY:  MYOCARDIAL PERFUSION STRESS TEST WITH LEXISCAN     Performing facility:  [facility],  [address] [facility] [address],  [place] [address]  [facility] Provider:  Javed Peralta DO, [facility]  PCP:  KAZ Fuentes  Supervising provider:     INDICATION:  CAD S/P PTCA; LONG STANDING A-FIB; SOB.     HISTORY:  Gender:  M; Age:  74 y/o ; Height:  170.1 cm; Weight:  90 kg.     HIGH CHOLESTEROL; CAD; PTA; FAMILY HX/CAD; HTN; A-FIB; SINUS NODE  DYSFUNCTION; SOB; S/P PTCA-2011 LAD,RCA.     Quit smoking 13 years ago.     Cardiac catheterization- MULTIPLE.  PTCA on 2011-LAD,RCA.     COMPARISON:  Previous nuclear testing completed on12/2021 at [facility].     ACCESSION NUMBER(S):  [id number]; [id number]; [id number]     ORDERING CLINICIAN:  JAVED PERALTA     TECHNIQUE:  ONE DAY protocol.  Stress injection: Date:09/25/2023, 35.1 mCi of Myoview IV 20 seconds  after rapid injection of Lexiscan.  Rest injection: Date: 09/25/2023, 9.5 mCi of Myoview IV at rest.  The patient had a rapid injection of  0.4 mg of Lexiscan IV over 10  seconds.  Imaging was performed by  gated tomographic technique.  Reason for Lexiscan: PVD; SOB.     STRESS TEST DATA:  Resting heart rate was 66 BPM.  Resting blood pressure was 128/78 mmHg.  Peak blood pressure was 136/92 mmHg.  Peak heart rate was 93 BPM.     TEST TERMINATED DUE TO:  Protocol completed.     FINDINGS:  STRESS TEST RESULTS:     Resting electrocardiogram revealed normal sinus rhythm.  There were no significant ischemic ECG changes or dysrhythmias.  The patient did not have chest pains/symptoms during procedure.  There was a normal recovery phase.     IMAGING RESULTS:     Image quality was good.  Rest and stress tomographic images " were reviewed and revealed normal  perfusion without evidence of ischemia, myocardial infarction, or  left ventricular dilatation with stress.  Overall left ventricular systolic function appeared to be normal  without regional wall motion abnormalities.  Ejection fraction was 74%.  TID is 1.0 and is normal.  There was evidence of soft tissue attenuation artifact.     IMPRESSION:  Normal Lexiscan Myoview cardiac perfusion stress test.  No evidence of ischemia or myocardial infarction by perfusion imaging.  Normal left ventricular systolic function, ejection fraction 74%.  When compared to prior study from December 2021, there has been no  significant changes.  None invasive risk stratification is low risk.  No orders to display   [address], [address], [place] [address]  Tel [phone] Fax [phone]     TRANSTHORACIC ECHOCARDIOGRAM REPORT        Patient Name:     ERICA Jensen Physician:  [id number] Javed KYLE  Study Date:       9/25/2023      Referring           JAVED FINNEGAN  Physician:  MRN/PID:          [id number]       PCP:                [id number] Juan Ramon De Guzman MD  Accession/Order#: [id number]   Department          [facility]  Location:           [place]  YOB: 1950      Fellow:  Gender:           M              Nurse:  Admit Date:       9/25/2023      Sonographer:        Javed Aleman  Admission Status: Outpatient     Additional Staff:  Height:           177.80 cm      CC Report to:  Weight:           94.80 kg       Study Type:         Echocardiogram  BSA:              2.13 m2  Blood Pressure: 138 /60 mmHg     Diagnosis/ICD: I48.11-Longstanding persistent AFib; I25.10-Atherosclerotic heart  disease of native coronary artery without angina pectoris;  I10-Essential (primary) hypertension; Z98.61-Coronary angioplasty  status (PTCA); R06.02-Shortness of breath  Indication:    AF, CAD, HTN, PTCA, SoB  Procedure/CPT:  Echo Complete w Full Doppler-[id number]     Patient History:  Smoker:            Former.  Pertinent History: A-Fib, CAD, COPD, HTN, Hyperlipidemia, Palpitations and PTCA,  SoB, Stent, High risk med use.     Study Detail: The following Echo studies were performed: 2D, M-Mode, Doppler and  color flow. The patient was awake.        PHYSICIAN INTERPRETATION:  Left Ventricle: Left ventricular systolic function is normal, with an estimated ejection fraction of 55-60%. There are no regional wall motion abnormalities. The left ventricular cavity size is normal. There is mild concentric left ventricular hypertrophy. Spectral Doppler shows an impaired relaxation pattern of left ventricular diastolic filling.  LV Wall Scoring:  All segments are normal.     Left Atrium: The left atrium is normal in size.  Right Ventricle: The right ventricle is normal in size. There is normal right ventricular global systolic function.  Right Atrium: The right atrium is normal in size.  Aortic Valve: The aortic valve appears structurally normal. The aortic valve appears tricuspid. There is mild aortic valve cusp calcification. There is evidence of mildly elevated transaortic gradients consistent with sclerosis of the aortic valve.  There is mild aortic valve regurgitation. The peak instantaneous gradient of the aortic valve is 11.0 mmHg. The mean gradient of the aortic valve is 5.0 mmHg.  Mitral Valve: The mitral valve is normal in structure. There is no evidence of mitral valve stenosis. The doppler estimated mean and peak diastolic pressure gradients are 2.0 mmHg and 3.4 mmHg respectively. There is normal mitral valve leaflet mobility. There is mild mitral valve regurgitation.  Tricuspid Valve: The tricuspid valve is structurally normal. There is normal tricuspid valve leaflet mobility. There is mild tricuspid regurgitation.  Pulmonic Valve: The pulmonic valve is structurally normal. There is no indication of pulmonic valve  regurgitation.  Pericardium: There is no pericardial effusion noted.  Aorta: The aortic root is normal.  Pulmonary Artery: The main pulmonary artery is normal in size, and position, with normal bifurcation into the left and right pulmonary arteries. The tricuspid regurgitant velocity is 2.59 m/s, and with an estimated right atrial pressure of 3 mmHg, the estimated pulmonary artery pressure is mildly elevated with the RVSP at 29.8 mmHg.  Systemic Veins: The inferior vena cava appears to be of normal size.  In comparison to the previous echocardiogram(s): The left ventricular function is unchanged. The left ventricular hypertrophy is unchanged. The left ventricular diastolic function is unchanged.        CONCLUSIONS:  1. Left ventricular systolic function is normal with a 55-60% estimated ejection fraction.  2. Spectral Doppler shows an impaired relaxation pattern of left ventricular diastolic filling.  3. There is no evidence of mitral valve stenosis.  4. Mild mitral valve regurgitation.  5. Mild tricuspid regurgitation is visualized.  6. Aortic valve sclerosis.  7. Mild aortic valve regurgitation.  8. The main pulmonary artery is normal in size, and position, with normal bifurcation into the left and right pulmonary arteries.    Assessment/Plan:     Diagnoses and all orders for this visit:  CAD S/P percutaneous coronary angioplasty  Benign essential hypertension  -     lisinopril 20 mg tablet; Take 1 tablet in am and 2 tablets in pm  PAD (peripheral artery disease) (CMS/HCC)  Mixed hyperlipidemia  Longstanding persistent atrial fibrillation (CMS/HCC)  High risk medication use      Patient Active Problem List   Diagnosis    Numbness and tingling in left arm    Nasal congestion    Mixed hyperlipidemia    Longstanding persistent atrial fibrillation (CMS/HCC)    Personal history of colonic polyps    Chronic obstructive pulmonary disease (CMS/HCC)    Benign essential hypertension    Intermittent claudication (CMS/HCC)     Atherosclerosis of both lower extremities with intermittent claudication (CMS/HCC)    Arthritis    PAD (peripheral artery disease) (CMS/HCC)    Sinus node dysfunction (CMS/HCC)    Encounter for screening for malignant neoplasm of colon         ASSESSMENT   73-year-old gentleman here for follow-up.    Meds, vitals, examination as noted.    Chart was reviewed in detail including nuclear scan results lab results and echocardiogram results and discussed with the patient in great detail.    Impression:  ASHD class II  Remote PCI and stenting  Persistent atrial fibrillation  Labile hypertension  Former smoker  Chronic anticoagulation and antiarrhythmic therapy  Chronic dyspnea  Sinus node dysfunction.    Recommendation:        PLAN     Increase lisinopril from 40 to 60 mg daily  Continue other meds as before  Blood pressure check in 1 month  Maintain other meds as before  Call if any problems  Routine follow-up visits at 6-month intervals        Javed Peralta,

## 2023-10-09 NOTE — PATIENT INSTRUCTIONS
Increase Lisinopril to 1 tablet in am and 2 tablets in the pm..    Follow up office visit in 1 month    Continue same medications/treatment.  Patient educated on proper medication use.  Please bring all medicines, vitamins and herbal supplements with you when you come to the office.

## 2023-10-16 ENCOUNTER — PROCEDURE VISIT (OUTPATIENT)
Dept: PAIN MANAGEMENT | Age: 73
End: 2023-10-16
Payer: MEDICARE

## 2023-10-16 DIAGNOSIS — M47.812 CERVICAL SPONDYLOSIS WITHOUT MYELOPATHY: Primary | ICD-10-CM

## 2023-10-16 PROCEDURE — 64634 DESTROY C/TH FACET JNT ADDL: CPT | Performed by: PAIN MEDICINE

## 2023-10-16 PROCEDURE — 64633 DESTROY CERV/THOR FACET JNT: CPT | Performed by: PAIN MEDICINE

## 2023-10-16 RX ORDER — DEXAMETHASONE SODIUM PHOSPHATE 10 MG/ML
10 INJECTION, SOLUTION INTRAMUSCULAR; INTRAVENOUS ONCE
Status: COMPLETED | OUTPATIENT
Start: 2023-10-16 | End: 2023-10-16

## 2023-10-16 RX ORDER — LIDOCAINE HYDROCHLORIDE 10 MG/ML
3 INJECTION, SOLUTION EPIDURAL; INFILTRATION; INTRACAUDAL; PERINEURAL ONCE
Status: COMPLETED | OUTPATIENT
Start: 2023-10-16 | End: 2023-10-16

## 2023-10-16 RX ADMIN — LIDOCAINE HYDROCHLORIDE 3 ML: 10 INJECTION, SOLUTION EPIDURAL; INFILTRATION; INTRACAUDAL; PERINEURAL at 10:08

## 2023-10-16 RX ADMIN — DEXAMETHASONE SODIUM PHOSPHATE 10 MG: 10 INJECTION, SOLUTION INTRAMUSCULAR; INTRAVENOUS at 10:09

## 2023-10-16 NOTE — PROGRESS NOTES
Procedure: B c456 cervical radiofrequency medial branch ablation using fluoroscopic needle guidance. Timeout taken to identify correct patient, procedure and side. Patient lying in the prone position the patient was prepped and draped in the usual sterile fashion using Betadine. The levels were determined under fluoroscopy. 1% preservative-free lidocaine was used to numb the skin using a 27-gauge 1-1/2 inch needle. Radiofrequency needle was introduced to the anatomic location of the medial branch at the lateral masses utilizing intermittent fluoroscopy. Motor stimulation up to 2 V was done to confirm no ablation of the ventral ramus at each level. Prior to lesioning at [address] [place] [address] for 90 seconds 1.5 mL of 1% preservative-free lidocaine along with 10 mg dexamethasone preservative-free was divided equally amongst the levels and injected with negative aspiration. This was done at each level. The procedure was tolerated well without complications. The patient was monitored after procedure, had their usual motor strength. And discharged home in stable condition. Patient will ice the area and  take it easy. All questions answered, chart was reviewed.

## 2023-10-19 ENCOUNTER — OFFICE VISIT (OUTPATIENT)
Dept: SURGERY | Facility: CLINIC | Age: 73
End: 2023-10-19
Payer: MEDICARE

## 2023-10-19 DIAGNOSIS — K64.2 GRADE III HEMORRHOIDS: ICD-10-CM

## 2023-10-19 DIAGNOSIS — K57.30 DIVERTICULOSIS OF COLON: ICD-10-CM

## 2023-10-19 DIAGNOSIS — D12.6 TUBULAR ADENOMA OF COLON: Primary | ICD-10-CM

## 2023-10-19 PROCEDURE — 1160F RVW MEDS BY RX/DR IN RCRD: CPT | Performed by: SURGERY

## 2023-10-19 PROCEDURE — 1036F TOBACCO NON-USER: CPT | Performed by: SURGERY

## 2023-10-19 PROCEDURE — 99213 OFFICE O/P EST LOW 20 MIN: CPT | Performed by: SURGERY

## 2023-10-19 PROCEDURE — 1126F AMNT PAIN NOTED NONE PRSNT: CPT | Performed by: SURGERY

## 2023-10-19 PROCEDURE — 1159F MED LIST DOCD IN RCRD: CPT | Performed by: SURGERY

## 2023-10-19 NOTE — PATIENT INSTRUCTIONS
I will repeat colonoscopy in 5 years; start high-fiber diet fiber supplement for the diverticulosis and hemorrhoids

## 2023-10-19 NOTE — PROGRESS NOTES
Subjective   Patient ID: Gutierrez Aldana is a 73 y.o. male who presents for Post-op.  HPI  73-year-old patient who underwent colonoscopy with polypectomy x 1 on 10/4; also noted to have grade 3 internal hemorrhoids and colonic diverticulosis.  Pathology shows tubular adenoma.    Objective   Physical Exam  Constitutional: no acute distress, well appearing and well nourished  Pulmonary: normal respiratory effort; clear to auscultation bilaterally, no wheezes or bronchi   Cardiovascular: regular rate and rhythm, no murmurs or extra-heart sounds; pedal pulses are normal  Abdomen: soft, non-tender, non-distended  Musculoskeletal: digits and nails normal without clubbing or cyanosis; Neurologic: cranial nerve II-XII intact grossly; normal gait  Psychiatric: oriented to person, place and time  Assessment/Plan   I discussed with patient the endoscopic findings and the pathology report.  Repeat colonoscopy in 5 years.  We talked about the management of the hemorrhoids and diverticulosis with high-fiber diet, fiber supplement and increase fluid intake.  All questions answered

## 2023-10-24 ENCOUNTER — OFFICE VISIT (OUTPATIENT)
Dept: PAIN MANAGEMENT | Age: 73
End: 2023-10-24
Payer: MEDICARE

## 2023-10-24 DIAGNOSIS — M47.817 LUMBOSACRAL SPONDYLOSIS WITHOUT MYELOPATHY: Primary | ICD-10-CM

## 2023-10-24 PROCEDURE — 64635 DESTROY LUMB/SAC FACET JNT: CPT | Performed by: PAIN MEDICINE

## 2023-10-24 PROCEDURE — 64636 DESTROY L/S FACET JNT ADDL: CPT | Performed by: PAIN MEDICINE

## 2023-10-24 RX ORDER — LIDOCAINE HYDROCHLORIDE 10 MG/ML
3 INJECTION, SOLUTION EPIDURAL; INFILTRATION; INTRACAUDAL; PERINEURAL ONCE
Status: COMPLETED | OUTPATIENT
Start: 2023-10-24 | End: 2023-10-24

## 2023-10-24 RX ORDER — BETAMETHASONE SODIUM PHOSPHATE AND BETAMETHASONE ACETATE 3; 3 MG/ML; MG/ML
6 INJECTION, SUSPENSION INTRA-ARTICULAR; INTRALESIONAL; INTRAMUSCULAR; SOFT TISSUE ONCE
Status: COMPLETED | OUTPATIENT
Start: 2023-10-24 | End: 2023-10-24

## 2023-10-24 RX ADMIN — BETAMETHASONE SODIUM PHOSPHATE AND BETAMETHASONE ACETATE 6 MG: 3; 3 INJECTION, SUSPENSION INTRA-ARTICULAR; INTRALESIONAL; INTRAMUSCULAR; SOFT TISSUE at 09:37

## 2023-10-24 RX ADMIN — LIDOCAINE HYDROCHLORIDE 3 ML: 10 INJECTION, SOLUTION EPIDURAL; INFILTRATION; INTRACAUDAL; PERINEURAL at 09:36

## 2023-11-13 ENCOUNTER — OFFICE VISIT (OUTPATIENT)
Dept: CARDIOLOGY | Facility: CLINIC | Age: 73
End: 2023-11-13
Payer: MEDICARE

## 2023-11-13 VITALS
SYSTOLIC BLOOD PRESSURE: 126 MMHG | HEART RATE: 72 BPM | HEIGHT: 69 IN | BODY MASS INDEX: 30.53 KG/M2 | DIASTOLIC BLOOD PRESSURE: 74 MMHG | WEIGHT: 206.1 LBS

## 2023-11-13 DIAGNOSIS — E66.9 CLASS 1 OBESITY WITHOUT SERIOUS COMORBIDITY WITH BODY MASS INDEX (BMI) OF 30.0 TO 30.9 IN ADULT, UNSPECIFIED OBESITY TYPE: ICD-10-CM

## 2023-11-13 DIAGNOSIS — Z98.61 CAD S/P PERCUTANEOUS CORONARY ANGIOPLASTY: ICD-10-CM

## 2023-11-13 DIAGNOSIS — I70.213 ATHEROSCLEROSIS OF NATIVE ARTERY OF BOTH LOWER EXTREMITIES WITH INTERMITTENT CLAUDICATION (CMS-HCC): ICD-10-CM

## 2023-11-13 DIAGNOSIS — I25.10 CAD S/P PERCUTANEOUS CORONARY ANGIOPLASTY: ICD-10-CM

## 2023-11-13 DIAGNOSIS — E78.2 MIXED HYPERLIPIDEMIA: ICD-10-CM

## 2023-11-13 DIAGNOSIS — I48.11 LONGSTANDING PERSISTENT ATRIAL FIBRILLATION (MULTI): ICD-10-CM

## 2023-11-13 DIAGNOSIS — Z87.891 FORMER SMOKER: ICD-10-CM

## 2023-11-13 DIAGNOSIS — I10 BENIGN ESSENTIAL HYPERTENSION: ICD-10-CM

## 2023-11-13 DIAGNOSIS — Z79.899 HIGH RISK MEDICATION USE: ICD-10-CM

## 2023-11-13 PROCEDURE — 99214 OFFICE O/P EST MOD 30 MIN: CPT | Performed by: INTERNAL MEDICINE

## 2023-11-13 PROCEDURE — 1036F TOBACCO NON-USER: CPT | Performed by: INTERNAL MEDICINE

## 2023-11-13 PROCEDURE — 3078F DIAST BP <80 MM HG: CPT | Performed by: INTERNAL MEDICINE

## 2023-11-13 PROCEDURE — 1160F RVW MEDS BY RX/DR IN RCRD: CPT | Performed by: INTERNAL MEDICINE

## 2023-11-13 PROCEDURE — 3074F SYST BP LT 130 MM HG: CPT | Performed by: INTERNAL MEDICINE

## 2023-11-13 PROCEDURE — 1159F MED LIST DOCD IN RCRD: CPT | Performed by: INTERNAL MEDICINE

## 2023-11-13 PROCEDURE — 3008F BODY MASS INDEX DOCD: CPT | Performed by: INTERNAL MEDICINE

## 2023-11-13 PROCEDURE — 1126F AMNT PAIN NOTED NONE PRSNT: CPT | Performed by: INTERNAL MEDICINE

## 2023-11-13 NOTE — PATIENT INSTRUCTIONS
Continue same medications/treatment.  Patient educated on proper medication use.  Patient educated on risk factor modification.  Please bring any lab results from other providers/physicians to your next appointment.    Please bring all medicines, vitamins, and herbal supplements with you when you come to the office.    Prescriptions will not be filled unless you are compliant with your follow up appointments or have a follow up appointment scheduled as per instruction of your physician. Refills should be requested at the time of your visit.    Follow up in 6 months    I, SAMMY GARCIA RN, AM SCRIBING FOR AND IN THE PRESENCE OF DR. RADHA FINNEGAN, DO, FACC

## 2023-11-13 NOTE — PROGRESS NOTES
Patient:  Gutierrez Aldana  YOB: 1950  MRN: [id number]       HPI:       Gutierrez Aldana is a 73 y.o. male who returns today for cardiac follow-up.  She was seen last month for review of testing which was fine but his pressure was high.  We adjusted his lisinopril.  He is here for follow-up.  His blood pressure is well controlled at this time and is symptom-free.      Objective:     Vitals:    11/13/23 1303   BP: 126/74   Pulse: 72       Wt Readings from Last 4 Encounters:   11/13/23 93.5 kg (206 lb 1.6 oz)   10/09/23 94.3 kg (207 lb 12.8 oz)   10/04/23 88.5 kg (195 lb 1.7 oz)   10/03/23 89.8 kg (198 lb)       Allergies:     No Known Allergies     Medications:     Current Outpatient Medications   Medication Instructions    acetaminophen (Tylenol) 325 mg capsule oral    apixaban (ELIQUIS) 5 mg, oral, 2 times daily    aspirin 81 mg EC tablet 1 tablet, oral, 2 times daily    dofetilide (Tikosyn) 250 mcg capsule 1 capsule, oral, 2 times daily    gabapentin (NEURONTIN) 100 mg, oral, Nightly    lisinopril 20 mg tablet Take 1 tablet in am and 2 tablets in pm    metoprolol tartrate (Lopressor) 25 mg tablet 1 tablet, oral, 2 times daily    nitroglycerin (NITROSTAT) 0.4 mg, sublingual, Every 5 min PRN    simvastatin (Zocor) 40 mg tablet 1 tablet, oral, Nightly    vit A,C and E-lutein-minerals (Ocuvite with Lutein) 300 mcg-200 mg-27 mg-2 mg tablet 1 tablet, oral, Daily       Physical Examination:     Constitutional:       Appearance: Healthy appearance. Not in distress.   Neck:      Vascular: No JVR. JVD normal.   Pulmonary:      Effort: Pulmonary effort is normal.      Breath sounds: Normal breath sounds. No wheezing. No rhonchi. No rales.   Chest:      Chest wall: Not tender to palpatation.   Cardiovascular:      PMI at left midclavicular line. Normal rate. Regular rhythm. Normal S1. Normal S2.       Murmurs: There is no murmur.      No gallop.  No click. No rub.   Pulses:     Intact distal pulses.   Edema:     " Peripheral edema absent.   Abdominal:      General: Bowel sounds are normal.      Palpations: Abdomen is soft.      Tenderness: There is no abdominal tenderness.   Musculoskeletal: Normal range of motion.         General: No tenderness. Skin:     General: Skin is warm and dry.   Neurological:      General: No focal deficit present.      Mental Status: Alert and oriented to person, place and time.          Lab:     CBC:   Lab Results   Component Value Date    WBC 6.5 06/06/2023    RBC 4.01 (L) 06/06/2023    HGB 14.5 06/06/2023    HCT 46.4 06/06/2023     06/06/2023        CMP:    Lab Results   Component Value Date     06/06/2023    K 5.1 06/06/2023     06/06/2023    CO2 28 06/06/2023    BUN 20 06/06/2023    CREATININE 0.98 06/06/2023    GLUCOSE 114 (H) 06/06/2023    CALCIUM 9.6 06/06/2023       Magnesium:    Lab Results   Component Value Date    MG 2.14 06/06/2023       Lipid Profile:    Lab Results   Component Value Date    TRIG 146 06/06/2023    HDL 58.9 06/06/2023       TSH:    No results found for: \"TSH\"    BNP:   Lab Results   Component Value Date     (H) 10/15/2022        PT/INR:    Lab Results   Component Value Date    PROTIME 13.9 (H) 10/15/2022    INR 1.2 (H) 10/15/2022       HgBA1c:    No results found for: \"HGBA1C\"    BMP:  Lab Results   Component Value Date     06/06/2023     10/15/2022     02/21/2022    K 5.1 06/06/2023    K 4.2 10/15/2022    K 4.3 02/21/2022     06/06/2023     10/15/2022     02/21/2022    CO2 28 06/06/2023    CO2 24 10/15/2022    CO2 31 02/21/2022    BUN 20 06/06/2023    BUN 18 10/15/2022    BUN 13 02/21/2022    CREATININE 0.98 06/06/2023    CREATININE 0.99 10/15/2022    CREATININE 1.15 02/21/2022       CBC:  Lab Results   Component Value Date    WBC 6.5 06/06/2023    WBC 9.6 10/15/2022    WBC 5.2 09/08/2020    RBC 4.01 (L) 06/06/2023    RBC 3.83 (L) 10/15/2022    RBC 3.73 (L) 09/08/2020    HGB 14.5 06/06/2023    HGB 13.7 " 10/15/2022    HGB 13.5 09/08/2020    HCT 46.4 06/06/2023    HCT 43.3 10/15/2022    HCT 42.2 09/08/2020     (H) 06/06/2023     (H) 10/15/2022     (H) 09/08/2020    MCHC 31.3 (L) 06/06/2023    MCHC 31.6 (L) 10/15/2022    MCHC 32.0 09/08/2020    RDW 12.7 06/06/2023    RDW 13.0 10/15/2022    RDW 12.9 09/08/2020     06/06/2023     10/15/2022     09/08/2020       Cardiac Enzymes:    Lab Results   Component Value Date    TROPHS 5 10/15/2022    TROPHS 4 10/15/2022       Hepatic Function Panel:    Lab Results   Component Value Date    ALKPHOS 55 06/06/2023    ALT 18 06/06/2023    AST 19 06/06/2023    PROT 7.0 06/06/2023    BILITOT 0.6 06/06/2023       Diagnostic Studies:     ECG 12 lead    Result Date: 10/4/2023  Normal sinus rhythm Normal ECG When compared with ECG of 15-OCT-2022 18:10, Previous ECG has undetermined rhythm, needs review Confirmed by Deena Dennis ([id number]) on 10/4/2023 8:23:40 PM    Colonoscopy Screening    Result Date: 10/4/2023  Table formatting from the original result was not included. Impression Localized diverticulosis of moderate severity in the sigmoid colon Medium (grade 3) hemorrhoids Subcentimeter polyp in the ascending colon was removed with cold snare The ileocecal valve appeared normal. The cecum appeared normal. The ascending colon appeared normal. The transverse colon appeared normal. The descending colon appeared normal. Findings Multiple medium and large localized diverticula of moderate severity in the sigmoid colon Internal medium (grade 3) hemorrhoids observed during retroflexion Sessile polyp measuring smaller than 5 mm in the ascending colon; performed cold snare with complete en bloc removal and retrieved specimen The ileocecal valve appeared normal. The cecum appeared normal. The ascending colon appeared normal. The transverse colon appeared normal. The descending colon appeared normal. Recommendation Repeat colonoscopy in 3 yrs due to inadequate  bowel prep Indication Personal history of colonic polyps, Encounter for screening for malignant neoplasm of colon Post Procedure Diagnosis Diverticulosis, hemorrhoids and ascending colon polyp Staff Staff Role Sanjana Newman Endo Technician Ashley Del Toro RN Endo Nurse Medications See Anesthesia Record. Preprocedure A history and physical has been performed, and patient medication allergies have been reviewed. The patient's tolerance of previous anesthesia has been reviewed. The risks and benefits of the procedure and the sedation options and risks were discussed with the patient. All questions were answered and informed consent obtained. Details of the Procedure The patient underwent monitored anesthesia care, which was administered by an anesthesia professional. The patient's blood pressure, heart rate, level of consciousness, respirations, oxygen, ECG and ETCO2 were monitored throughout the procedure. A digital rectal exam was performed. The scope was introduced through the anus and advanced to the cecum. Retroflexion was performed in the rectum. Bowel prep was not adequate. The patient experienced no blood loss. The procedure was moderately difficult due to inadequate bowel prep of right colon due to loose stool coating the mucosa preventing adequate evaluation even after irrigation. The patient tolerated the procedure well. There were no apparent adverse events. Events Procedure Events Event Event Time ENDO SCOPE IN TIME 10/4/2023  9:27 AM ENDO CECUM REACHED 10/4/2023  9:31 AM ENDO SCOPE OUT TIME 10/4/2023  9:45 AM Specimens ID Type Source Tests Collected by Time 1 : ascending colon polyp Tissue ASCENDING COLON POLYP SURGICAL PATHOLOGY EXAM Laureano Og MD 10/4/2023 0931 Procedure Location [facility] [address] [facility] [id number] [phone] Referring Provider Laureano Og Md [address] [facility] [address], [address] [place] [address] Procedure  Provider Laureano Og MD      Radiology:     No orders to display       Problem List:     Patient Active Problem List   Diagnosis    Numbness and tingling in left arm    Nasal congestion    Mixed hyperlipidemia    Longstanding persistent atrial fibrillation (CMS/HCC)    Personal history of colonic polyps    CAD S/P percutaneous coronary angioplasty    Chronic obstructive pulmonary disease (CMS/HCC)    Benign essential hypertension    Intermittent claudication (CMS/HCC)    Atherosclerosis of both lower extremities with intermittent claudication (CMS/HCC)    Arthritis    PAD (peripheral artery disease) (CMS/HCC)    Sinus node dysfunction (CMS/HCC)    Encounter for screening for malignant neoplasm of colon    Former smoker    Class 1 obesity with body mass index (BMI) of 30.0 to 30.9 in adult    High risk medication use    Tubular adenoma of colon    Grade III hemorrhoids    Diverticulosis of colon       Asessment:   73-year-old gentleman here for follow-up and blood pressure recheck.    Meds, vitals, examination as noted.    Chart reviewed in detail discussed the patient at length.    Impression:  Hypertension controlled now on adjusted medicines  Persistent A-fib  Chronic anticoagulation  Hypertension  Dyslipidemia  Obesity  Sinus node dysfunction  CAD stable  Remote PCI and stenting      Plan:   Recommendation:  Maintain current meds  See me in 6 months  Call if any problems issues arise  Exercise and Weight loss strongly advised

## 2023-12-03 NOTE — PROGRESS NOTES
"Subjective   Patient ID: Gutierrez Aldana is an 73 y.o. male who is presenting today for a Medicare Annual Wellness Visit Subsequent .      The patients living will is active. His POA is daughter Michelle, back-up POA is son .  The patients current code status is DNR.     Encounter for subsequent AWV: Medicare wellness visit done, patient is in satisfactory living circumstances where the patient's needs are met.  This patient is advised to develop or update their living will and provide us a copy for the chart.    Obesity: The patient is present for a follow up for obesity. The patient is continuously working on diet and exercise. The patient will continue working on strategies to maintain weight loss and stay well hydrated.     Hypertension: The patient is here for follow-up of elevated blood pressure. He is adherent to a low salt diet. Blood pressure is well controlled at home. No new myalgias or GI upset on diet control.    Hyperlipidemia: The patient is present today for a follow up of hyperlipidemia. He denies having any leg cramping in particular. He is trying to follow a low-fat diet.       The patient denies having the following symptoms: chest pain, chest pressure, fever, chills, N/V/D, constipation, dizziness, headaches, SOB.    Objective   Vitals:  Pulse 73   Temp 36.7 °C (98.1 °F)   Resp 14   Ht 1.753 m (5' 9\")   Wt 95.3 kg (210 lb)   SpO2 95%   BMI 31.01 kg/m²       Physical Exam  Vitals reviewed.   Constitutional:       Appearance: Normal appearance. He is obese.   HENT:      Head:      Comments: Tingling on scalp     Neck:      Vascular: No carotid bruit.   Cardiovascular:      Rate and Rhythm: Normal rate and regular rhythm.      Pulses: Normal pulses.      Heart sounds: Normal heart sounds.   Pulmonary:      Effort: Pulmonary effort is normal. No respiratory distress.      Breath sounds: Normal breath sounds. No wheezing.   Abdominal:      General: There is no distension.      Palpations: Abdomen " is soft. There is no mass.      Tenderness: There is no abdominal tenderness. There is no right CVA tenderness, left CVA tenderness, guarding or rebound.   Musculoskeletal:      Cervical back: Normal range of motion and neck supple. No rigidity.      Right lower leg: No edema.      Left lower leg: No edema.   Lymphadenopathy:      Cervical: No cervical adenopathy.   Neurological:      Mental Status: He is alert.         Labs reviewed from : 06/06/2023 CMP, CBC, Lipid,         Assessment/Plan   Problem List Items Addressed This Visit       Mixed hyperlipidemia    Current Assessment & Plan     Is clinically stable so we will continue with current medications and lab work to confirm status ordered.           Relevant Orders    Lipid Panel    Benign essential hypertension    Current Assessment & Plan     Is clinically stable so we will continue with current medications and lab work to confirm status ordered.          Relevant Orders    CBC and Auto Differential    Comprehensive Metabolic Panel    Magnesium    Follow Up In [facility] - PCP - Established    Class 1 obesity with body mass index (BMI) of 30.0 to 30.9 in adult    Current Assessment & Plan     The patient is continuously working on diet and exercise. The patient will continue working on strategies to maintain weight loss and stay well hydrated.            Encounter for subsequent annual wellness visit (AWV) in Medicare patient - Primary    Current Assessment & Plan     Medicare wellness visit done, patient is in satisfactory living circumstances where the patient's needs are met.  This patient is advised to develop or update their living will and provide us a copy for the chart.              Follow up in: 6 month(s) HTN; HLD or sooner if needed with labs prior.    Scribe Attestation  By signing my name below, IVa Scribe   attest that this documentation has been prepared under the direction and in the presence of Juan Ramon De Guzman  MD.

## 2023-12-04 ENCOUNTER — OFFICE VISIT (OUTPATIENT)
Dept: PRIMARY CARE | Facility: CLINIC | Age: 73
End: 2023-12-04
Payer: MEDICARE

## 2023-12-04 VITALS
OXYGEN SATURATION: 95 % | RESPIRATION RATE: 14 BRPM | HEIGHT: 69 IN | TEMPERATURE: 98.1 F | WEIGHT: 210 LBS | BODY MASS INDEX: 31.1 KG/M2 | HEART RATE: 73 BPM

## 2023-12-04 DIAGNOSIS — E66.9 CLASS 1 OBESITY WITHOUT SERIOUS COMORBIDITY WITH BODY MASS INDEX (BMI) OF 30.0 TO 30.9 IN ADULT, UNSPECIFIED OBESITY TYPE: ICD-10-CM

## 2023-12-04 DIAGNOSIS — E78.2 MIXED HYPERLIPIDEMIA: ICD-10-CM

## 2023-12-04 DIAGNOSIS — I10 BENIGN ESSENTIAL HYPERTENSION: ICD-10-CM

## 2023-12-04 DIAGNOSIS — Z00.00 ROUTINE GENERAL MEDICAL EXAMINATION AT HEALTH CARE FACILITY: ICD-10-CM

## 2023-12-04 DIAGNOSIS — Z00.00 ENCOUNTER FOR SUBSEQUENT ANNUAL WELLNESS VISIT (AWV) IN MEDICARE PATIENT: Primary | ICD-10-CM

## 2023-12-04 PROCEDURE — 1160F RVW MEDS BY RX/DR IN RCRD: CPT | Performed by: FAMILY MEDICINE

## 2023-12-04 PROCEDURE — 1126F AMNT PAIN NOTED NONE PRSNT: CPT | Performed by: FAMILY MEDICINE

## 2023-12-04 PROCEDURE — 1170F FXNL STATUS ASSESSED: CPT | Performed by: FAMILY MEDICINE

## 2023-12-04 PROCEDURE — G0439 PPPS, SUBSEQ VISIT: HCPCS | Performed by: FAMILY MEDICINE

## 2023-12-04 PROCEDURE — 99214 OFFICE O/P EST MOD 30 MIN: CPT | Performed by: FAMILY MEDICINE

## 2023-12-04 PROCEDURE — 3008F BODY MASS INDEX DOCD: CPT | Performed by: FAMILY MEDICINE

## 2023-12-04 PROCEDURE — 1036F TOBACCO NON-USER: CPT | Performed by: FAMILY MEDICINE

## 2023-12-04 PROCEDURE — 1159F MED LIST DOCD IN RCRD: CPT | Performed by: FAMILY MEDICINE

## 2023-12-04 ASSESSMENT — PATIENT HEALTH QUESTIONNAIRE - PHQ9
2. FEELING DOWN, DEPRESSED OR HOPELESS: NOT AT ALL
SUM OF ALL RESPONSES TO PHQ9 QUESTIONS 1 AND 2: 0
1. LITTLE INTEREST OR PLEASURE IN DOING THINGS: NOT AT ALL

## 2023-12-04 ASSESSMENT — ACTIVITIES OF DAILY LIVING (ADL)
DOING_HOUSEWORK: INDEPENDENT
BATHING: INDEPENDENT
GROCERY_SHOPPING: INDEPENDENT
DRESSING: INDEPENDENT
MANAGING_FINANCES: INDEPENDENT
TAKING_MEDICATION: INDEPENDENT

## 2023-12-04 ASSESSMENT — ENCOUNTER SYMPTOMS
OCCASIONAL FEELINGS OF UNSTEADINESS: 0
DEPRESSION: 0
LOSS OF SENSATION IN FEET: 1

## 2023-12-15 DIAGNOSIS — I48.11 LONGSTANDING PERSISTENT ATRIAL FIBRILLATION (MULTI): ICD-10-CM

## 2023-12-18 RX ORDER — DOFETILIDE 0.25 MG/1
CAPSULE ORAL 2 TIMES DAILY
Qty: 180 CAPSULE | Refills: 0 | Status: SHIPPED | OUTPATIENT
Start: 2023-12-18 | End: 2024-03-12

## 2024-01-26 ENCOUNTER — TELEPHONE (OUTPATIENT)
Dept: PRIMARY CARE | Facility: CLINIC | Age: 74
End: 2024-01-26
Payer: MEDICARE

## 2024-02-27 NOTE — PROGRESS NOTES
"Subjective    Patient ID: Gutierrez Aldana is a 74 y.o. male who presents for Cough (Productive after hacking), Fatigue, and Chills.     Cough: Patient is presenting today for a follow up of chills, productive cough, and fatigue . Onset approximately one month. Productive of clear phlegm. Denies fever. He will end up wearing a coat for long periods of time due to the chills. SOB with walking.     Atrial Fibrillation: The patient presents for a follow up of A-Fib.  The patient denies feeling tachycardic or an irregular heartbeat.     PAD with claudication: The patient presents for a follow up of PAD with claudication.     The patient denies having the following symptoms: chest pain, chest pressure, fever, N/V/D, constipation, dizziness, headaches.    Objective   Vitals:  /64   Pulse 74   Temp 36.3 °C (97.4 °F)   Resp 14   Ht 1.753 m (5' 9\")   Wt 89.9 kg (198 lb 3.2 oz)   SpO2 98%   BMI 29.27 kg/m²     Physical Exam  Vitals reviewed.   Constitutional:       Appearance: Normal appearance.   HENT:      Head:      Comments: Diffuse sinus area tenderness noted     Right Ear: Tympanic membrane and ear canal normal.      Left Ear: Tympanic membrane and ear canal normal.      Nose: Congestion and rhinorrhea present.   Cardiovascular:      Rate and Rhythm: Normal rate and regular rhythm.      Heart sounds: Normal heart sounds.   Pulmonary:      Effort: No respiratory distress.      Breath sounds: Normal breath sounds.   Neurological:      Mental Status: He is alert.          Assessment/Plan   Problem List Items Addressed This Visit       Longstanding persistent atrial fibrillation (CMS/HCC)      Is well controlled, continue with current medications.          Atherosclerosis of both lower extremities with intermittent claudication (CMS/HCC)      Is stable, continue with current treatment.          SOB (shortness of breath) on exertion      Is present and symptomatic, will need treatment.          Relevant Orders "    XR chest 2 views    Acute bacterial bronchitis - Primary      Is present and symptomatic, will need treatment.          Relevant Medications    amoxicillin-pot clavulanate (Augmentin) 875-125 mg tablet       Follow up in: 06/18/2024 as scheduled or sooner if needed.     Scribe Attestation  By signing my name below, IVa Scribe   attest that this documentation has been prepared under the direction and in the presence of Juan Ramon De Guzman MD.

## 2024-02-29 ENCOUNTER — OFFICE VISIT (OUTPATIENT)
Dept: PRIMARY CARE | Facility: CLINIC | Age: 74
End: 2024-02-29
Payer: MEDICARE

## 2024-02-29 ENCOUNTER — HOSPITAL ENCOUNTER (OUTPATIENT)
Dept: RADIOLOGY | Facility: HOSPITAL | Age: 74
Discharge: HOME | End: 2024-02-29
Payer: MEDICARE

## 2024-02-29 VITALS
WEIGHT: 198.2 LBS | SYSTOLIC BLOOD PRESSURE: 118 MMHG | DIASTOLIC BLOOD PRESSURE: 64 MMHG | HEIGHT: 69 IN | TEMPERATURE: 97.4 F | BODY MASS INDEX: 29.36 KG/M2 | RESPIRATION RATE: 14 BRPM | HEART RATE: 74 BPM | OXYGEN SATURATION: 98 %

## 2024-02-29 DIAGNOSIS — I70.213 ATHEROSCLEROSIS OF NATIVE ARTERY OF BOTH LOWER EXTREMITIES WITH INTERMITTENT CLAUDICATION (CMS-HCC): ICD-10-CM

## 2024-02-29 DIAGNOSIS — R06.02 SOB (SHORTNESS OF BREATH) ON EXERTION: ICD-10-CM

## 2024-02-29 DIAGNOSIS — I48.11 LONGSTANDING PERSISTENT ATRIAL FIBRILLATION (MULTI): ICD-10-CM

## 2024-02-29 DIAGNOSIS — B96.89 ACUTE BACTERIAL BRONCHITIS: Primary | ICD-10-CM

## 2024-02-29 DIAGNOSIS — J20.8 ACUTE BACTERIAL BRONCHITIS: Primary | ICD-10-CM

## 2024-02-29 PROBLEM — Z12.11 ENCOUNTER FOR SCREENING FOR MALIGNANT NEOPLASM OF COLON: Status: RESOLVED | Noted: 2023-10-04 | Resolved: 2024-02-29

## 2024-02-29 PROBLEM — I49.5 SINUS NODE DYSFUNCTION (MULTI): Status: RESOLVED | Noted: 2023-08-26 | Resolved: 2024-02-29

## 2024-02-29 PROCEDURE — 71046 X-RAY EXAM CHEST 2 VIEWS: CPT | Performed by: STUDENT IN AN ORGANIZED HEALTH CARE EDUCATION/TRAINING PROGRAM

## 2024-02-29 PROCEDURE — 3078F DIAST BP <80 MM HG: CPT | Performed by: FAMILY MEDICINE

## 2024-02-29 PROCEDURE — 99213 OFFICE O/P EST LOW 20 MIN: CPT | Performed by: FAMILY MEDICINE

## 2024-02-29 PROCEDURE — 3074F SYST BP LT 130 MM HG: CPT | Performed by: FAMILY MEDICINE

## 2024-02-29 PROCEDURE — 71046 X-RAY EXAM CHEST 2 VIEWS: CPT

## 2024-02-29 PROCEDURE — 1159F MED LIST DOCD IN RCRD: CPT | Performed by: FAMILY MEDICINE

## 2024-02-29 PROCEDURE — 3008F BODY MASS INDEX DOCD: CPT | Performed by: FAMILY MEDICINE

## 2024-02-29 PROCEDURE — 1160F RVW MEDS BY RX/DR IN RCRD: CPT | Performed by: FAMILY MEDICINE

## 2024-02-29 PROCEDURE — 1036F TOBACCO NON-USER: CPT | Performed by: FAMILY MEDICINE

## 2024-02-29 PROCEDURE — 1126F AMNT PAIN NOTED NONE PRSNT: CPT | Performed by: FAMILY MEDICINE

## 2024-02-29 PROCEDURE — 1157F ADVNC CARE PLAN IN RCRD: CPT | Performed by: FAMILY MEDICINE

## 2024-02-29 RX ORDER — AMOXICILLIN AND CLAVULANATE POTASSIUM 875; 125 MG/1; MG/1
875 TABLET, FILM COATED ORAL 2 TIMES DAILY
Qty: 20 TABLET | Refills: 0 | Status: SHIPPED | OUTPATIENT
Start: 2024-02-29 | End: 2024-03-10

## 2024-03-12 DIAGNOSIS — I48.11 LONGSTANDING PERSISTENT ATRIAL FIBRILLATION (MULTI): ICD-10-CM

## 2024-03-12 RX ORDER — DOFETILIDE 0.25 MG/1
CAPSULE ORAL 2 TIMES DAILY
Qty: 180 CAPSULE | Refills: 1 | Status: SHIPPED | OUTPATIENT
Start: 2024-03-12 | End: 2024-06-11

## 2024-03-14 DIAGNOSIS — M19.90 ARTHRITIS: Primary | ICD-10-CM

## 2024-03-14 RX ORDER — GABAPENTIN 100 MG/1
100 CAPSULE ORAL
Qty: 90 CAPSULE | Refills: 3 | Status: SHIPPED | OUTPATIENT
Start: 2024-03-14

## 2024-03-14 NOTE — TELEPHONE ENCOUNTER
Recent Visits  Date Type Provider Dept   02/29/24 Office Visit Juan Ramon De Guzman MD Do [email or web address]   12/04/23 Office Visit Juan Ramon De Guzman MD Do [id number]   09/07/23 Office Visit Juan Ramon De Guzman MD Do [id number]   06/06/23 Office Visit Juan Ramon De Guzman MD Do [id number]   Showing recent visits within past 540 days and meeting all other requirements  Future Appointments  Date Type Provider Dept   06/18/24 Appointment Juan Ramon De Guzman MD Do [id number]   Showing future appointments within next 180 days and meeting all other requirements

## 2024-03-15 DIAGNOSIS — E78.2 MIXED HYPERLIPIDEMIA: ICD-10-CM

## 2024-03-15 DIAGNOSIS — I48.11 LONGSTANDING PERSISTENT ATRIAL FIBRILLATION (MULTI): ICD-10-CM

## 2024-03-15 RX ORDER — APIXABAN 5 MG/1
5 TABLET, FILM COATED ORAL 2 TIMES DAILY
Qty: 180 TABLET | Refills: 0 | Status: SHIPPED | OUTPATIENT
Start: 2024-03-15 | End: 2024-06-11

## 2024-03-15 RX ORDER — METOPROLOL TARTRATE 25 MG/1
25 TABLET, FILM COATED ORAL 2 TIMES DAILY
Qty: 180 TABLET | Refills: 0 | Status: SHIPPED | OUTPATIENT
Start: 2024-03-15 | End: 2024-06-11

## 2024-03-18 RX ORDER — SIMVASTATIN 40 MG/1
40 TABLET, FILM COATED ORAL NIGHTLY
Qty: 90 TABLET | Refills: 1 | Status: SHIPPED | OUTPATIENT
Start: 2024-03-18

## 2024-04-10 ENCOUNTER — APPOINTMENT (OUTPATIENT)
Dept: CARDIOLOGY | Facility: CLINIC | Age: 74
End: 2024-04-10
Payer: MEDICARE

## 2024-04-18 ENCOUNTER — OFFICE VISIT (OUTPATIENT)
Dept: CARDIOLOGY | Facility: CLINIC | Age: 74
End: 2024-04-18
Payer: MEDICARE

## 2024-04-18 VITALS
SYSTOLIC BLOOD PRESSURE: 108 MMHG | WEIGHT: 190 LBS | BODY MASS INDEX: 28.14 KG/M2 | HEIGHT: 69 IN | HEART RATE: 55 BPM | DIASTOLIC BLOOD PRESSURE: 62 MMHG

## 2024-04-18 DIAGNOSIS — I10 BENIGN ESSENTIAL HYPERTENSION: ICD-10-CM

## 2024-04-18 DIAGNOSIS — I70.213 ATHEROSCLEROSIS OF NATIVE ARTERY OF BOTH LOWER EXTREMITIES WITH INTERMITTENT CLAUDICATION (CMS-HCC): ICD-10-CM

## 2024-04-18 DIAGNOSIS — Z98.61 CAD S/P PERCUTANEOUS CORONARY ANGIOPLASTY: ICD-10-CM

## 2024-04-18 DIAGNOSIS — R06.02 SOB (SHORTNESS OF BREATH) ON EXERTION: ICD-10-CM

## 2024-04-18 DIAGNOSIS — I25.10 CAD S/P PERCUTANEOUS CORONARY ANGIOPLASTY: ICD-10-CM

## 2024-04-18 DIAGNOSIS — E78.2 MIXED HYPERLIPIDEMIA: ICD-10-CM

## 2024-04-18 DIAGNOSIS — I48.11 LONGSTANDING PERSISTENT ATRIAL FIBRILLATION (MULTI): Primary | ICD-10-CM

## 2024-04-18 DIAGNOSIS — Z79.899 HIGH RISK MEDICATION USE: ICD-10-CM

## 2024-04-18 PROCEDURE — 1036F TOBACCO NON-USER: CPT | Performed by: NURSE PRACTITIONER

## 2024-04-18 PROCEDURE — 1160F RVW MEDS BY RX/DR IN RCRD: CPT | Performed by: NURSE PRACTITIONER

## 2024-04-18 PROCEDURE — 1159F MED LIST DOCD IN RCRD: CPT | Performed by: NURSE PRACTITIONER

## 2024-04-18 PROCEDURE — 3074F SYST BP LT 130 MM HG: CPT | Performed by: NURSE PRACTITIONER

## 2024-04-18 PROCEDURE — 1157F ADVNC CARE PLAN IN RCRD: CPT | Performed by: NURSE PRACTITIONER

## 2024-04-18 PROCEDURE — 3008F BODY MASS INDEX DOCD: CPT | Performed by: NURSE PRACTITIONER

## 2024-04-18 PROCEDURE — 99213 OFFICE O/P EST LOW 20 MIN: CPT | Performed by: NURSE PRACTITIONER

## 2024-04-18 PROCEDURE — 3078F DIAST BP <80 MM HG: CPT | Performed by: NURSE PRACTITIONER

## 2024-04-18 NOTE — PROGRESS NOTES
"CARDIOLOGY OFFICE VISIT      CHIEF COMPLAINT  Chief Complaint   Patient presents with    Atrial Fibrillation     Chief complaint: \"I am having arthritis pain in my left hip and neck.\"  HISTORY OF PRESENT ILLNESS  HPI  History: The patient is a 74-year-old  male is followed for persistent atrial fibrillation controlled on dofetilide, beta-blockade, and anticoagulated with Eliquis.  He has a history of coronary artery disease status post angioplasty with drug-eluting stent to the proximal LAD on April 8, 2011 and Lexiscan stress test dated September 25, 2023 revealing no acute ischemic changes or infarct patterns with an ejection fraction of 74%.  In February the patient was treated for bronchitis with chronic cough.  PA and lateral chest x-ray revealed no acute findings.  He states the cough lasted approximately 1 month.  He denies chest pain, palpitations, dizziness, lightheadedness, shortness of breath, abdominal distention, or lower extremity edema.  He states that he is following up with pain management for arthritis pain of the left hip and neck.  Past Medical History  Past Medical History:   Diagnosis Date    Acute upper respiratory infection, unspecified     Acute upper respiratory infection    Anesthesia of skin     Numbness of left foot    Atrial fibrillation (Multi)     CAD S/P percutaneous coronary angioplasty 06/03/2023    Diverticulosis of intestine, part unspecified, without perforation or abscess without bleeding 08/28/2018    Diverticulosis    History of adenomatous polyp of colon 06/03/2023    Other general symptoms and signs     Abnormal ankle brachial index (SOFIA)    Overweight 05/16/2022    Overweight with body mass index (BMI) of 29 to 29.9 in adult    Overweight 05/16/2022    Overweight    Personal history of other benign neoplasm 08/28/2018    History of other benign neoplasm    Personal history of other diseases of the circulatory system 07/06/2016    History of coronary " atherosclerosis    Personal history of other diseases of the circulatory system 07/06/2016    History of hypertension    Personal history of other diseases of the musculoskeletal system and connective tissue     History of back pain    Personal history of other diseases of the musculoskeletal system and connective tissue     History of neck pain    Personal history of other endocrine, nutritional and metabolic disease     History of hypercholesterolemia    Personal history of other endocrine, nutritional and metabolic disease     History of hyperlipidemia    Personal history of other endocrine, nutritional and metabolic disease     History of hyperlipidemia    Personal history of other specified conditions     History of chest pain    Personal history of other specified conditions 02/17/2022    History of palpitations    Radiculopathy, cervical region     Cervical neuritis    Radiculopathy, lumbar region     Lumbar radiculopathy, acute    Solitary pulmonary nodule     Pulmonary nodule    Spondylosis without myelopathy or radiculopathy, cervical region     Cervical osteoarthritis    Unspecified osteoarthritis, unspecified site     Osteoarthritis    Wears glasses        Social History  Social History     Tobacco Use    Smoking status: Former     Current packs/day: 0.00     Average packs/day: 2.0 packs/day for 44.0 years (88.0 ttl pk-yrs)     Types: Cigarettes     Start date: 1965     Quit date: 2009     Years since quitting: 15.3    Smokeless tobacco: Never   Vaping Use    Vaping status: Never Used   Substance Use Topics    Alcohol use: Yes     Alcohol/week: 6.0 standard drinks of alcohol     Types: 6 Glasses of wine per week     Comment: 1 bottle of wine weekly    Drug use: Never       Family History     Family History   Problem Relation Name Age of Onset    Cancer Mother      Atrial fibrillation Sister      Other (CARDIAC PACEMAKER) Sister          Allergies:  No Known Allergies     Outpatient Medications:  Current  Outpatient Medications   Medication Instructions    acetaminophen (Tylenol) 325 mg capsule oral    aspirin 81 mg EC tablet 1 tablet, oral, 2 times daily    dofetilide (Tikosyn) 250 mcg capsule oral, 2 times daily    Eliquis 5 mg, oral, 2 times daily    gabapentin (NEURONTIN) 100 mg, oral    lisinopril 20 mg tablet Take 1 tablet in am and 2 tablets in pm    metoprolol tartrate (LOPRESSOR) 25 mg, oral, 2 times daily    nitroglycerin (NITROSTAT) 0.4 mg, sublingual, Every 5 min PRN    simvastatin (ZOCOR) 40 mg, oral, Nightly    vit A,C and E-lutein-minerals (Ocuvite with Lutein) 300 mcg-200 mg-27 mg-2 mg tablet 1 tablet, oral, Daily          REVIEW OF SYSTEMS  Review of Systems   All other systems reviewed and are negative.        VITALS  Vitals:    04/18/24 1334   BP: 108/62   Pulse: 55       PHYSICAL EXAM  Vitals and nursing note reviewed.   Constitutional:       Appearance: Normal appearance.   HENT:      Head: Normocephalic.   Neck:      Vascular: No JVD.   Cardiovascular:      Rate and Rhythm: Normal rate and regular rhythm.      Pulses: Normal pulses.      Heart sounds: Normal heart sounds.   Pulmonary:      Effort: Pulmonary effort is normal.      Breath sounds: Normal breath sounds.   Abdominal:      General: Bowel sounds are normal.      Palpations: Abdomen is soft.   Musculoskeletal:         General: Normal range of motion.      Cervical back: Normal range of motion.   Skin:     General: Skin is warm and dry.   Neurological:      General: No focal deficit present.      Mental Status: She is alert and oriented to person, place, and time.      Motor: Motor function is intact.   Psychiatric:         Attention and Perception: Attention and perception normal.         Mood and Affect: Mood and affect normal.         Speech: Speech normal.         Behavior: Behavior normal. Behavior is cooperative.         Thought Content: Thought content normal.         Cognition and Memory: Cognition and memory normal.        Labs  and testing: Twelve-lead EKG reveals sinus bradycardia at 55 bpm, first-degree AV block with a SD interval of 222 ms.  QRS durations 82 ms,  ms, QTc 411 ms.  No acute ischemic changes or infarct patterns are noted.  2D echocardiogram dated September 26, 2023 revealed an ejection fraction of 55 to 60% with mild MR, TR and AR.    ASSESSMENT AND PLAN       Clinical impressions:   1. Persistent atrial fibrillation controlled on dofetilide, beta-blockade, and anticoagulated with Eliquis.  2. Coronary artery disease status post angioplasty with drug-eluting stent to the proximal LAD on April 8, 2011 and Lexiscan stress test dated September 25, 2023 revealing an ejection fraction of 74% with no acute ischemic changes or infarct patterns.    3. Normal left ventricular function per 2D echocardiogram dated September 26, 2023 with an ejection fraction of 55 to 60%.    5. Mild valvular heart disease consisting of mild aortic valve cusp calcification, mild TR and trace SD per 2D echocardiogram dated December 14, 2021.  5. Multiple pulmonary nodules per CT scan of the chest dated May 2, 2017.  6. Underlying sinus node dysfunction.  7. Hypertension, controlled with a blood pressure today of 108/62.  8. Dyslipidemia on statin.  9. Peripheral vascular disease.  10. Class I obesity with a BMI of 28.06.    Recommendations:  1.  Continue current medications as prescribed.  Continue lifestyle modifications as discussed.  3.  Follow-up in office with Dr. Peralta on Carmita 3, 2024 at 12 PM as scheduled.  4.  Follow-up in office with Dr. Dennis in 6 months or sooner if needed.    Evaluation and note by Kalee Torres, CNP  **Please excuse any errors in grammar or translation related to this dictation.  Voice recognition software was utilized to prepare this document.**

## 2024-04-22 ENCOUNTER — TELEPHONE (OUTPATIENT)
Dept: PAIN MANAGEMENT | Age: 74
End: 2024-04-22

## 2024-04-22 ENCOUNTER — OFFICE VISIT (OUTPATIENT)
Dept: PAIN MANAGEMENT | Age: 74
End: 2024-04-22
Payer: MEDICARE

## 2024-04-22 VITALS
DIASTOLIC BLOOD PRESSURE: 72 MMHG | BODY MASS INDEX: 28.14 KG/M2 | SYSTOLIC BLOOD PRESSURE: 126 MMHG | WEIGHT: 190 LBS | HEIGHT: 69 IN

## 2024-04-22 DIAGNOSIS — M47.817 LUMBOSACRAL SPONDYLOSIS WITHOUT MYELOPATHY: Primary | ICD-10-CM

## 2024-04-22 DIAGNOSIS — M47.812 CERVICAL SPONDYLOSIS WITHOUT MYELOPATHY: ICD-10-CM

## 2024-04-22 PROCEDURE — 64493 INJ PARAVERT F JNT L/S 1 LEV: CPT | Performed by: PAIN MEDICINE

## 2024-04-22 PROCEDURE — 99213 OFFICE O/P EST LOW 20 MIN: CPT | Performed by: PAIN MEDICINE

## 2024-04-22 RX ORDER — LIDOCAINE HYDROCHLORIDE 10 MG/ML
3 INJECTION, SOLUTION EPIDURAL; INFILTRATION; INTRACAUDAL; PERINEURAL ONCE
Status: COMPLETED | OUTPATIENT
Start: 2024-04-22 | End: 2024-04-22

## 2024-04-22 RX ORDER — BETAMETHASONE SODIUM PHOSPHATE AND BETAMETHASONE ACETATE 3; 3 MG/ML; MG/ML
6 INJECTION, SUSPENSION INTRA-ARTICULAR; INTRALESIONAL; INTRAMUSCULAR; SOFT TISSUE ONCE
Status: COMPLETED | OUTPATIENT
Start: 2024-04-22 | End: 2024-04-22

## 2024-04-22 RX ADMIN — BETAMETHASONE SODIUM PHOSPHATE AND BETAMETHASONE ACETATE 6 MG: 3; 3 INJECTION, SUSPENSION INTRA-ARTICULAR; INTRALESIONAL; INTRAMUSCULAR; SOFT TISSUE at 09:19

## 2024-04-22 RX ADMIN — LIDOCAINE HYDROCHLORIDE 3 ML: 10 INJECTION, SOLUTION EPIDURAL; INFILTRATION; INTRACAUDAL; PERINEURAL at 09:19

## 2024-04-22 NOTE — PROGRESS NOTES
[facility]  [facility]  [address], [address]  [place], [place]  P: [phone]  F: [phone]        Esvin Dasilva  (1950)    4/22/2024    Subjective:     Esvin Dasilva is 74 y.o. male who complains today of:     Chief Complaint   Patient presents with    Back Pain     Patient here today for follow-up.  He has a lot of low back pain neck pain.  Turning twisting bending bothers him achy sharp pain varies he has been taking anti-inflammatories are not helping much.  History of RF ablation in both lumbar cervical spine helped over 50%.  Has done physical therapy.  The pain affects his quality life considerably.    Plan: We discussed options.  Will get him set up for the lumbar RF ablation and cervical RF ablation as it helped over 50% in the past.  He has failed conservative treatment.  Imaging studies chart was reviewed he is in agreement the plan.  Questions answered chart was reviewed.    Allergies:  Patient has no known allergies.    No past medical history on file.  No past surgical history on file.  No family history on file.  Social History     Socioeconomic History    Marital status:      Spouse name: Not on file    Number of children: Not on file    Years of education: Not on file    Highest education level: Not on file   Occupational History    Not on file   Tobacco Use    Smoking status: Former    Smokeless tobacco: Never   Substance and Sexual Activity    Alcohol use: Yes     Alcohol/week: 9.0 standard drinks of alcohol     Types: 9 Glasses of wine per week    Drug use: Not on file    Sexual activity: Not on file   Other Topics Concern    Not on file   Social History Narrative    Not on file     Social Determinants of Health     Financial Resource Strain: Not on file   Food Insecurity: Not on file   Transportation Needs: Not on file   Physical Activity: Not on file   Stress: Not on file   Social Connections: Not

## 2024-04-22 NOTE — PROGRESS NOTES
[facility]  Ne[facility]  [address], [address]  [place], [place]  P: [phone]  F: [phone]      LUMBAR MEDIAL BRANCH BLOCK      Provider: CECILY TRIPLETT DO          Patient Name: Esvin Dasilva : 1950        Date: 2024       Esvin Dasilva is here today for interventional pain management.  Standard ASI guidelines were followed and sterile technique used.  Area was cleaned with Betadine x3.  Informed consent was obtained.  Fluoroscopic guidance was used for this procedure. Junction of superior articular process and transverse process was identified. For L5 dorsal primary ramus groove of sacral ala and SAP of S1 was identified. Appropriate length spinal needle was used and advanced to correct anatomic location. Negative aspiration was achieved.  At each site approximately 1/2cc of 1% preservative free Lidocaine was injected without difficulty.  6 mg Celestone added    Patient tolerated the procedure well, no obvious complications occurred during the procedure.  Patient was appropriately monitored and discharged home in stable condition with their usual motor strength. The patient will keep close track of pain over the next several hours and call our office tomorrow and let us know what percentage of pain relief is experienced.  Post op Instructions were given to patient. Relevant and recent imaging reviewed with patient today.                  [] Bilateral [] T12 [] S1      [x] L1 [] S2     [] Right [x] L2 [] S3      [] L3      [x] Left [] L4         [] L5 [] S.IPedro TRIPLETT DO

## 2024-04-22 NOTE — TELEPHONE ENCOUNTER
L/m he is not due for the lumbar rfa until after 5/24 can do the cervical or an injection above the area of the lower rfa

## 2024-04-29 ENCOUNTER — OFFICE VISIT (OUTPATIENT)
Dept: PAIN MANAGEMENT | Age: 74
End: 2024-04-29
Payer: MEDICARE

## 2024-04-29 DIAGNOSIS — M47.817 LUMBOSACRAL SPONDYLOSIS WITHOUT MYELOPATHY: ICD-10-CM

## 2024-04-29 DIAGNOSIS — M47.812 CERVICAL SPONDYLOSIS WITHOUT MYELOPATHY: Primary | ICD-10-CM

## 2024-04-29 PROCEDURE — 64633 DESTROY CERV/THOR FACET JNT: CPT | Performed by: PAIN MEDICINE

## 2024-04-29 PROCEDURE — 64634 DESTROY C/TH FACET JNT ADDL: CPT | Performed by: PAIN MEDICINE

## 2024-04-29 RX ORDER — DEXAMETHASONE SODIUM PHOSPHATE 10 MG/ML
10 INJECTION, SOLUTION INTRAMUSCULAR; INTRAVENOUS ONCE
Status: COMPLETED | OUTPATIENT
Start: 2024-04-29 | End: 2024-04-29

## 2024-04-29 RX ORDER — LIDOCAINE HYDROCHLORIDE 10 MG/ML
3 INJECTION, SOLUTION EPIDURAL; INFILTRATION; INTRACAUDAL; PERINEURAL ONCE
Status: COMPLETED | OUTPATIENT
Start: 2024-04-29 | End: 2024-04-29

## 2024-04-29 RX ADMIN — DEXAMETHASONE SODIUM PHOSPHATE 10 MG: 10 INJECTION, SOLUTION INTRAMUSCULAR; INTRAVENOUS at 10:26

## 2024-04-29 RX ADMIN — LIDOCAINE HYDROCHLORIDE 3 ML: 10 INJECTION, SOLUTION EPIDURAL; INFILTRATION; INTRACAUDAL; PERINEURAL at 10:27

## 2024-04-29 NOTE — PROGRESS NOTES
Procedure: B c456 cervical radiofrequency medial branch ablation using fluoroscopic needle guidance.    Timeout taken to identify correct patient, procedure and side.    Patient lying in the prone position the patient was prepped and draped in the usual sterile fashion using Betadine.  The levels were determined under fluoroscopy.  1% preservative-free lidocaine was used to numb the skin using a 27-gauge 1-1/2 inch needle.  Radiofrequency needle was introduced to the anatomic location of the medial branch at the lateral masses utilizing intermittent fluoroscopy.  Motor stimulation up to 2 V was done to confirm no ablation of the ventral ramus at each level.  Prior to lesioning at 80 °C for 90 seconds 1.5 mL of 1% preservative-free lidocaine along with 10 mg dexamethasone preservative-free was divided equally amongst the levels and injected with negative aspiration.  This was done at each level.    The procedure was tolerated well without complications.  The patient was monitored after procedure, had their usual motor strength.  And discharged home in stable condition.  Patient will ice the area and  take it easy.  All questions answered, chart was reviewed.

## 2024-04-30 ENCOUNTER — OFFICE VISIT (OUTPATIENT)
Dept: PAIN MANAGEMENT | Age: 74
End: 2024-04-30
Payer: MEDICARE

## 2024-04-30 DIAGNOSIS — M47.817 LUMBOSACRAL SPONDYLOSIS WITHOUT MYELOPATHY: Primary | ICD-10-CM

## 2024-04-30 PROCEDURE — 64636 DESTROY L/S FACET JNT ADDL: CPT | Performed by: PAIN MEDICINE

## 2024-04-30 PROCEDURE — 64635 DESTROY LUMB/SAC FACET JNT: CPT | Performed by: PAIN MEDICINE

## 2024-04-30 RX ORDER — BETAMETHASONE SODIUM PHOSPHATE AND BETAMETHASONE ACETATE 3; 3 MG/ML; MG/ML
6 INJECTION, SUSPENSION INTRA-ARTICULAR; INTRALESIONAL; INTRAMUSCULAR; SOFT TISSUE ONCE
Status: COMPLETED | OUTPATIENT
Start: 2024-04-30 | End: 2024-04-30

## 2024-04-30 RX ORDER — LIDOCAINE HYDROCHLORIDE 10 MG/ML
3 INJECTION, SOLUTION EPIDURAL; INFILTRATION; INTRACAUDAL; PERINEURAL ONCE
Status: COMPLETED | OUTPATIENT
Start: 2024-04-30 | End: 2024-04-30

## 2024-04-30 RX ADMIN — LIDOCAINE HYDROCHLORIDE 3 ML: 10 INJECTION, SOLUTION EPIDURAL; INFILTRATION; INTRACAUDAL; PERINEURAL at 09:37

## 2024-04-30 RX ADMIN — BETAMETHASONE SODIUM PHOSPHATE AND BETAMETHASONE ACETATE 6 MG: 3; 3 INJECTION, SUSPENSION INTRA-ARTICULAR; INTRALESIONAL; INTRAMUSCULAR; SOFT TISSUE at 09:36

## 2024-04-30 NOTE — PROGRESS NOTES
[facility]  [facility]  [address], [address]  [place], [place]  P: [phone]  F: [phone]      Lumbar Radio Frequency Ablation     Provider: CECILY TRIPLETT DO          Patient Name: Esvin Dasilva : 1950        Date: 2024      Esvin Dasilva is here today for interventional pain management.  Standard ASI guidelines were followed and sterile technique used.  Area was cleaned with Betadine x3.  Informed consent was obtained.  Fluoroscopic guidance was used for this procedure. Multiple views of fluoroscopy were used during procedure to assist with needle placement. Appropriate sized RF 10mm active tip needle was used and advance to appropriate anatomic location.    There was appropriate multifidus contraction noted with motor stimulation at 2 Hz between 0.5-1.5 volts. No limb or gluteal contraction was noted taking it up to 3.5 volts. Prior to lesioning at 80 degrees Celsius for 90 seconds, approximately 0.75mg/1mg of Celestone and ½ cc of 1% preservative free Lidocaine was injected. Impedance was between 200-500 ohms during the procedure.     Patient tolerated the procedure well, no obvious complications occurred during the procedure.  Patient was appropriately monitored and discharged home in stable condition with their usual motor strength. Post Op instructions were given to patient.          [] Bilateral [] T11 [] L1 [] S1     [] T12 [] L2 [] S2    [] Right  [x] L3 [] S3      [x] L4 [] S4    [x] Left  [x] L5                              CECILY TRIPLETT DO

## 2024-06-03 ENCOUNTER — OFFICE VISIT (OUTPATIENT)
Dept: CARDIOLOGY | Facility: CLINIC | Age: 74
End: 2024-06-03
Payer: MEDICARE

## 2024-06-03 VITALS
SYSTOLIC BLOOD PRESSURE: 108 MMHG | WEIGHT: 190.5 LBS | DIASTOLIC BLOOD PRESSURE: 62 MMHG | HEART RATE: 72 BPM | HEIGHT: 70 IN | BODY MASS INDEX: 27.27 KG/M2

## 2024-06-03 DIAGNOSIS — I70.213 ATHEROSCLEROSIS OF NATIVE ARTERY OF BOTH LOWER EXTREMITIES WITH INTERMITTENT CLAUDICATION (CMS-HCC): ICD-10-CM

## 2024-06-03 DIAGNOSIS — E78.2 MIXED HYPERLIPIDEMIA: ICD-10-CM

## 2024-06-03 DIAGNOSIS — I25.10 CAD S/P PERCUTANEOUS CORONARY ANGIOPLASTY: ICD-10-CM

## 2024-06-03 DIAGNOSIS — Z98.61 CAD S/P PERCUTANEOUS CORONARY ANGIOPLASTY: ICD-10-CM

## 2024-06-03 DIAGNOSIS — Z87.891 FORMER SMOKER: ICD-10-CM

## 2024-06-03 DIAGNOSIS — I10 BENIGN ESSENTIAL HYPERTENSION: ICD-10-CM

## 2024-06-03 DIAGNOSIS — I48.11 LONGSTANDING PERSISTENT ATRIAL FIBRILLATION (MULTI): ICD-10-CM

## 2024-06-03 PROCEDURE — 1157F ADVNC CARE PLAN IN RCRD: CPT | Performed by: INTERNAL MEDICINE

## 2024-06-03 PROCEDURE — 1159F MED LIST DOCD IN RCRD: CPT | Performed by: INTERNAL MEDICINE

## 2024-06-03 PROCEDURE — 3008F BODY MASS INDEX DOCD: CPT | Performed by: INTERNAL MEDICINE

## 2024-06-03 PROCEDURE — 3078F DIAST BP <80 MM HG: CPT | Performed by: INTERNAL MEDICINE

## 2024-06-03 PROCEDURE — 99214 OFFICE O/P EST MOD 30 MIN: CPT | Performed by: INTERNAL MEDICINE

## 2024-06-03 PROCEDURE — 1036F TOBACCO NON-USER: CPT | Performed by: INTERNAL MEDICINE

## 2024-06-03 PROCEDURE — 3074F SYST BP LT 130 MM HG: CPT | Performed by: INTERNAL MEDICINE

## 2024-06-03 NOTE — PROGRESS NOTES
Patient:  Gutierrez Aldana  YOB: 1950  MRN: [id number]       HPI:       Gutierrez Aldana is a 74 y.o. male who returns today for cardiac follow-up.  Patient has history coronary disease angioplasty stenting also A-fib.  He also has hypertension hyperlipidemia.  He feels well.  Is having no cardiac issues.  Bronchitis over the winter.  He is on his usual meds.  His last cardiac studies were in September last year.  He has no clinical complaints at this time      Objective:     There were no vitals filed for this visit.    Wt Readings from Last 4 Encounters:   04/18/24 86.2 kg (190 lb)   02/29/24 89.9 kg (198 lb 3.2 oz)   12/04/23 95.3 kg (210 lb)   11/13/23 93.5 kg (206 lb 1.6 oz)       Allergies:     No Known Allergies     Medications:     Current Outpatient Medications   Medication Instructions    acetaminophen (Tylenol) 325 mg capsule oral    aspirin 81 mg EC tablet 1 tablet, oral, 2 times daily    dofetilide (Tikosyn) 250 mcg capsule oral, 2 times daily    Eliquis 5 mg, oral, 2 times daily    gabapentin (NEURONTIN) 100 mg, oral    lisinopril 20 mg tablet Take 1 tablet in am and 2 tablets in pm    metoprolol tartrate (LOPRESSOR) 25 mg, oral, 2 times daily    nitroglycerin (NITROSTAT) 0.4 mg, sublingual, Every 5 min PRN    simvastatin (ZOCOR) 40 mg, oral, Nightly    vit A,C and E-lutein-minerals (Ocuvite with Lutein) 300 mcg-200 mg-27 mg-2 mg tablet 1 tablet, oral, Daily       Physical Examination:   Constitutional:       Appearance: Healthy appearance. Not in distress.   Neck:      Vascular: No JVR. JVD normal.   Pulmonary:      Effort: Pulmonary effort is normal.      Breath sounds: Normal breath sounds. No wheezing. No rhonchi. No rales.   Chest:      Chest wall: Not tender to palpatation.   Cardiovascular:      PMI at left midclavicular line. Normal rate. Regular rhythm. Normal S1. Normal S2.       Murmurs: There is no murmur.      No gallop.  No click. No rub.   Pulses:     Intact distal pulses.  "  Edema:     Peripheral edema absent.   Abdominal:      General: Bowel sounds are normal.      Palpations: Abdomen is soft.      Tenderness: There is no abdominal tenderness.   Musculoskeletal: Normal range of motion.         General: No tenderness. Skin:     General: Skin is warm and dry.   Neurological:      General: No focal deficit present.      Mental Status: Alert and oriented to person, place and time.        Lab:     CBC:   Lab Results   Component Value Date    WBC 6.5 06/06/2023    RBC 4.01 (L) 06/06/2023    HGB 14.5 06/06/2023    HCT 46.4 06/06/2023     06/06/2023        CMP:    Lab Results   Component Value Date     06/06/2023    K 5.1 06/06/2023     06/06/2023    CO2 28 06/06/2023    BUN 20 06/06/2023    CREATININE 0.98 06/06/2023    GLUCOSE 114 (H) 06/06/2023    CALCIUM 9.6 06/06/2023       Magnesium:    Lab Results   Component Value Date    MG 2.14 06/06/2023       Lipid Profile:    Lab Results   Component Value Date    TRIG 146 06/06/2023    HDL 58.9 06/06/2023       TSH:    No results found for: \"TSH\"    BNP:   Lab Results   Component Value Date     (H) 10/15/2022        PT/INR:    Lab Results   Component Value Date    PROTIME 13.9 (H) 10/15/2022    INR 1.2 (H) 10/15/2022       HgBA1c:    No results found for: \"HGBA1C\"    BMP:  Lab Results   Component Value Date     06/06/2023     10/15/2022     02/21/2022    K 5.1 06/06/2023    K 4.2 10/15/2022    K 4.3 02/21/2022     06/06/2023     10/15/2022     02/21/2022    CO2 28 06/06/2023    CO2 24 10/15/2022    CO2 31 02/21/2022    BUN 20 06/06/2023    BUN 18 10/15/2022    BUN 13 02/21/2022    CREATININE 0.98 06/06/2023    CREATININE 0.99 10/15/2022    CREATININE 1.15 02/21/2022       CBC:  Lab Results   Component Value Date    WBC 6.5 06/06/2023    WBC 9.6 10/15/2022    WBC 5.2 09/08/2020    RBC 4.01 (L) 06/06/2023    RBC 3.83 (L) 10/15/2022    RBC 3.73 (L) 09/08/2020    HGB 14.5 06/06/2023    " HGB 13.7 10/15/2022    HGB 13.5 09/08/2020    HCT 46.4 06/06/2023    HCT 43.3 10/15/2022    HCT 42.2 09/08/2020     (H) 06/06/2023     (H) 10/15/2022     (H) 09/08/2020    MCHC 31.3 (L) 06/06/2023    MCHC 31.6 (L) 10/15/2022    MCHC 32.0 09/08/2020    RDW 12.7 06/06/2023    RDW 13.0 10/15/2022    RDW 12.9 09/08/2020     06/06/2023     10/15/2022     09/08/2020       Cardiac Enzymes:    Lab Results   Component Value Date    TROPHS 5 10/15/2022    TROPHS 4 10/15/2022       Hepatic Function Panel:    Lab Results   Component Value Date    ALKPHOS 55 06/06/2023    ALT 18 06/06/2023    AST 19 06/06/2023    PROT 7.0 06/06/2023    BILITOT 0.6 06/06/2023       Diagnostic Studies:     XR chest 2 views    Result Date: 3/1/2024  Interpreted By:  Mague Ford, STUDY: XR CHEST 2 VIEWS; 2/29/2024 11:39 am   INDICATION: Signs/Symptoms:SOB   COMPARISON: CT PE 10/15/2022   ACCESSION NUMBER(S): [id number]   ORDERING CLINICIAN: KAZ LEE   TECHNIQUE: Frontal and lateral radiographs of the chest were obtained.   FINDINGS: LINES AND DEVICES: None.   LUNGS: No focal consolidation, pulmonary edema, pleural effusion or pneumothorax.   CARDIOMEDIASTINAL SILHOUETTE: The cardiomediastinal silhouette is within normal limits.   OTHER: No acute osseous abnormality.       No acute cardiopulmonary process.   MACRO None   Signed by: Mague Ford 3/1/2024 6:18 PM Dictation workstation:   [id number]    MRN: [id number]  Patient Name: ERICA THOMPSON     STUDY:  MYOCARDIAL PERFUSION STRESS TEST WITH LEXISCAN     Performing facility:  [facility],  [address] [place] [address] [address],  [address] Provider:  Javed Peralta DO, [facility]  PCP:  Dr. LONNY RODRÍGUEZ  Supervising provider:  Juvenal Nation MD, [facility]     INDICATION:  CAD; S/P PTCA     HISTORY:  Gender:  M; Age:  70 y/o ; Height:  177.8 cm; Weight:  96.8 kg.     High Cholesterol;  HTN;  Chest Pain;  COPD;  CAD;   Palpitations;  PAD  SINUS NODE DYSFUNCTION     Quit smoking N/A years ago.     Cardiac catheterization on MULTIPLE.  PTCA on LAD RCA.     COMPARISON:  Previous nuclear testing completed [id number] at [facility].        ACCESSION NUMBER(S):  [id number]; [id number]; [id number]     ORDERING CLINICIAN:  RADHA FINNEGAN     TECHNIQUE:  ONE DAY protocol.  Stress injection: Date:12/14/21, 35.3 mCi of Myoview IV 20 seconds  after rapid injection of Lexiscan.  Rest injection: Date: 12/14/21, 10.6 mCi of Myoview IV at rest.  The patient had a rapid injection of  0.4 mg of Lexiscan IV over 10  seconds.  Imaging was performed by  gated tomographic technique.  Reason for Lexiscan:     STRESS TEST DATA:  Resting heart rate was 73 BPM.  Resting blood pressure was 138/76 mmHg.  Peak blood pressure was 148/80 mmHg.  Peak heart rate was 89 BPM.     TEST TERMINATED DUE TO:  Protocol completed     FINDINGS:  STRESS TEST RESULTS:     Resting electrocardiogram revealed normal sinus rhythm.  There were no significant ischemic ECG changes or dysrhythmias.  The patient did not have chest pains/symptoms during procedure.  There was a normal recovery phase.     IMAGING RESULTS:     Image quality was good.  Rest and stress tomographic images were reviewed and revealed normal  perfusion without evidence of ischemia, myocardial infarction, or  left ventricular dilatation with stress.  Overall left ventricular systolic function appeared to be normal  without regional wall motion abnormalities.  Ejection fraction was 74%.  TID is 1.1 and is normal.  There was evidence of diaphragmatic attenuation artifact.     IMPRESSION:  Normal Lexiscan Myoview cardiac perfusion stress test.  No evidence of ischemia or myocardial infarction by perfusion imaging.  Normal left ventricular systolic function, ejection fraction 74%.  When compared to prior study from October 2019, there has been no  significant changes.  None invasive risk stratification is low risk.               [address], [address], [place], [place] [address]           Tel [phone] Fax [phone]     TRANSTHORACIC ECHOCARDIOGRAM REPORT        Patient Name:     ERICA THOMPSON Reading Physician:   [id number] Javed Peralta DO  Study Date:       12/14/2021       Referring Physician: [id number] Javed Fabian   MRN/PID:          [id number]         PCP:                 [id number] Jaren Snyder MD  Accession/Order#: [id number]     Department Location: [facility]  YOB: 1950        Fellow:  Gender:           M                Nurse:  Admit Date:                        Sonographer:         Jessica Neal RDCS,                                                          RDMS, RVT  Admission Status: Outpatient       Additional Staff:  Height:           175.26 cm        CC Report to:  Weight:           91.63 kg         Study Type:          Echocardiogram  BSA:              2.07 m2     Diagnosis/ICD: I48.11-Longstanding persistent AFib  Indication:    Afib, CAD, COPD, PAD, HTN  Procedure/CPT: Echo Complete w Full [id number]   Study Detail: The following Echo studies were performed: 2D, M-Mode, Doppler and                color flow.        PHYSICIAN INTERPRETATION:  Left Ventricle: The left ventricular systolic function is normal, with an estimated ejection fraction of 60%. There are no regional wall motion abnormalities. The left ventricular cavity size is normal. There is mild concentric left ventricular hypertrophy. Spectral Doppler shows an impaired relaxation pattern of left ventricular diastolic filling.  LV Wall Scoring:  All segments are normal.     Left Atrium: The left atrium is upper limits of normal in size.  Right Ventricle: The right ventricle is normal in size. There is normal right ventricular global systolic function.  Right Atrium: The  right atrium is normal in size.  Aortic Valve: The aortic valve appears structurally normal. The aortic valve appears tricuspid. There is mild aortic valve cusp calcification. There is no evidence of aortic valve stenosis.  There is no evidence of aortic valve regurgitation. The peak instantaneous gradient of the aortic valve is 7.3 mmHg. The mean gradient of the aortic valve is 4.0 mmHg.  Mitral Valve: The mitral valve is normal in structure. There is no evidence of mitral valve stenosis. The doppler estimated mean and peak diastolic pressure gradients are 1.0 mmHg and 2.5 mmHg respectively. There is normal mitral valve leaflet mobility. There is mild mitral annular calcification. There is no evidence of mitral valve regurgitation.  Tricuspid Valve: The tricuspid valve is structurally normal. There is normal tricuspid valve leaflet mobility. There is mild tricuspid regurgitation.  Pulmonic Valve: The pulmonic valve is not well visualized. There is trace pulmonic valve regurgitation.  Pericardium: There is no pericardial effusion noted. There is a pericardial fat pad present.  Aorta: The aortic root is normal. There is mild dilatation of the ascending aorta. The aortic root is at the upper limits of normal size.  Pulmonary Artery: The main pulmonary artery is normal in size, and position, with normal bifurcation into the left and right pulmonary arteries. The tricuspid regurgitant velocity is 2.47 m/s, and with an estimated right atrial pressure of 3 mmHg, the estimated pulmonary artery pressure is normal with the RVSP at 22.8 mmHg.  Systemic Veins: The inferior vena cava appears to be of normal size.  In comparison to the previous echocardiogram(s): There are no prior studies on this patient for comparison purposes.        CONCLUSIONS:   1. The left ventricular systolic function is normal with a 60% estimated ejection fraction.   2. Spectral Doppler shows an impaired relaxation pattern of left ventricular diastolic  filling.   3. There is mitral stenosis is not seen.   4. Aortic valve stenosis is not present.   5. The main pulmonary artery is normal in size, and position, with normal bifurcation into the left and right pulmonary arteries.  Radiology:     No orders to display       Problem List:     Patient Active Problem List   Diagnosis    Numbness and tingling in left arm    Nasal congestion    Mixed hyperlipidemia    Longstanding persistent atrial fibrillation (Multi)    Personal history of colonic polyps    CAD S/P percutaneous coronary angioplasty    Benign essential hypertension    Atherosclerosis of both lower extremities with intermittent claudication (CMS-HCC)    Arthritis    Former smoker    High risk medication use    Tubular adenoma of colon    Grade III hemorrhoids    Diverticulosis of colon    Encounter for subsequent annual wellness visit (AWV) in Medicare patient    SOB (shortness of breath) on exertion    Acute bacterial bronchitis       Asessment:       74-year-old gentleman here for routine cardiovascular follow-up.    Meds, vitals, examination as noted.    Chart reviewed in detail discussed with the patient at length.    Impression:  ASHD class I-II  Remote PCI and stenting  Hypertension controlled  Persistent atrial fibrillation  Chronic dyspnea  Dyslipidemia  Former smoker  Plan:   Recommendation:  Maintain current meds  Usual exercise and activity  See me in 6 months  Call if any issue problems arise  Plan repeat cardiac studies in the form of 2025

## 2024-06-04 ENCOUNTER — APPOINTMENT (OUTPATIENT)
Dept: PRIMARY CARE | Facility: CLINIC | Age: 74
End: 2024-06-04
Payer: MEDICARE

## 2024-06-11 DIAGNOSIS — I48.11 LONGSTANDING PERSISTENT ATRIAL FIBRILLATION (MULTI): ICD-10-CM

## 2024-06-11 RX ORDER — METOPROLOL TARTRATE 25 MG/1
25 TABLET, FILM COATED ORAL 2 TIMES DAILY
Qty: 180 TABLET | Refills: 3 | Status: SHIPPED | OUTPATIENT
Start: 2024-06-11

## 2024-06-11 RX ORDER — APIXABAN 5 MG/1
5 TABLET, FILM COATED ORAL 2 TIMES DAILY
Qty: 180 TABLET | Refills: 3 | Status: SHIPPED | OUTPATIENT
Start: 2024-06-11

## 2024-06-11 RX ORDER — DOFETILIDE 0.25 MG/1
250 CAPSULE ORAL 2 TIMES DAILY
Qty: 180 CAPSULE | Refills: 3 | Status: SHIPPED | OUTPATIENT
Start: 2024-06-11 | End: 2025-06-11

## 2024-06-11 NOTE — TELEPHONE ENCOUNTER
Received request for prescription refills for patient.   Patient follows with Dr. Dennis     Request is for Dofetilide 250mcg BID  Is patient currently on medication YES    Last OV 4/18/24  Next OV 10/18/24    Pended for signing and sent to provider

## 2024-06-17 NOTE — PROGRESS NOTES
"Subjective   Patient ID: Gutierrez Aldana is a 74 y.o. male who presents for Hyperlipidemia, Hypertension, Atrial Fibrillation, and Artherosclerosis (Lower extremities, bilateral).    Hyperlipidemia: The patient is present today for a follow up of hyperlipidemia. He denies having any leg cramping in particular. He is trying to follow a low-fat diet. He is taking simvastatin 40 mg nightly.     Hypertension: The patient is here for follow-up of elevated blood pressure. He is adherent to a low salt diet. Blood pressure is well controlled at home. No new myalgias or GI upset on diet control. He is taking lisinopril 20 mg daily, Lopressor 25 mg two times daily.    Atrial Fibrillation: The patient presents for a follow up of A-Fib. He is taking dofetilide 250 mcg two times daily, Lopressor 25 mg two times daily, Eliquis 5 mg two times daily. Patient is stable and reports no concerning symptoms.     Atherosclerosis: patient has atherosclerosis of bilateral lower extremities.     Patient was advised to get vaccines against RSV, tetanus at the pharmacy.     Objective   /72   Pulse 64   Temp 36.1 °C (97 °F)   Resp 16   Ht 1.765 m (5' 9.5\")   Wt 87.4 kg (192 lb 9.6 oz)   SpO2 97%   BMI 28.03 kg/m²     Physical Exam  Vitals reviewed.   Constitutional:       Appearance: Normal appearance.   Neck:      Vascular: No carotid bruit.   Cardiovascular:      Rate and Rhythm: Normal rate and regular rhythm.      Pulses: Normal pulses.      Heart sounds: Normal heart sounds.   Pulmonary:      Effort: Pulmonary effort is normal. No respiratory distress.      Breath sounds: Normal breath sounds. No wheezing.   Abdominal:      General: There is no distension.      Palpations: Abdomen is soft. There is no mass.      Tenderness: There is no abdominal tenderness. There is no right CVA tenderness, left CVA tenderness, guarding or rebound.   Musculoskeletal:      Cervical back: Normal range of motion and neck supple. No rigidity.    "   Right lower leg: No edema.      Left lower leg: No edema.   Lymphadenopathy:      Cervical: No cervical adenopathy.   Neurological:      Mental Status: He is alert.         Labs reviewed from :  6/6/2023           CMP, CBC, Lipid      Assessment/Plan   Problem List Items Addressed This Visit       Mixed hyperlipidemia - Primary     Is clinically stable so we will continue with current medications and lab work to confirm status ordered.          Relevant Orders    Lipid Panel    Benign essential hypertension      Is well controlled, continue with current medications.          Relevant Orders    CBC and Auto Differential    Comprehensive Metabolic Panel    Magnesium    Follow Up In [facility] - PCP - Medicare Annual     Other Visit Diagnoses       Screening for prostate cancer        Relevant Orders    Prostate Specific Antigen, Screen                Follow up in: 6 months follow up and AWV  or sooner if needed with labs today.    Scribe Attestation  By signing my name below, IQuynh , Rajeev   attest that this documentation has been prepared under the direction and in the presence of Juan Ramon De Guzman MD.

## 2024-06-18 ENCOUNTER — LAB (OUTPATIENT)
Dept: LAB | Facility: LAB | Age: 74
End: 2024-06-18
Payer: MEDICARE

## 2024-06-18 ENCOUNTER — APPOINTMENT (OUTPATIENT)
Dept: PRIMARY CARE | Facility: CLINIC | Age: 74
End: 2024-06-18
Payer: MEDICARE

## 2024-06-18 VITALS
HEIGHT: 70 IN | BODY MASS INDEX: 27.57 KG/M2 | WEIGHT: 192.6 LBS | OXYGEN SATURATION: 97 % | DIASTOLIC BLOOD PRESSURE: 72 MMHG | HEART RATE: 64 BPM | SYSTOLIC BLOOD PRESSURE: 106 MMHG | RESPIRATION RATE: 16 BRPM | TEMPERATURE: 97 F

## 2024-06-18 DIAGNOSIS — I10 BENIGN ESSENTIAL HYPERTENSION: ICD-10-CM

## 2024-06-18 DIAGNOSIS — Z12.5 SCREENING FOR PROSTATE CANCER: ICD-10-CM

## 2024-06-18 DIAGNOSIS — E78.2 MIXED HYPERLIPIDEMIA: Primary | ICD-10-CM

## 2024-06-18 DIAGNOSIS — E78.2 MIXED HYPERLIPIDEMIA: ICD-10-CM

## 2024-06-18 LAB
ALBUMIN SERPL BCP-MCNC: 4.4 G/DL (ref 3.4–5)
ALP SERPL-CCNC: 52 U/L (ref 33–136)
ALT SERPL W P-5'-P-CCNC: 15 U/L (ref 10–52)
ANION GAP SERPL CALC-SCNC: 10 MMOL/L (ref 10–20)
AST SERPL W P-5'-P-CCNC: 17 U/L (ref 9–39)
BASOPHILS # BLD AUTO: 0.09 X10*3/UL (ref 0–0.1)
BASOPHILS NFR BLD AUTO: 1.4 %
BILIRUB SERPL-MCNC: 0.4 MG/DL (ref 0–1.2)
BUN SERPL-MCNC: 18 MG/DL (ref 6–23)
CALCIUM SERPL-MCNC: 9.8 MG/DL (ref 8.6–10.3)
CHLORIDE SERPL-SCNC: 105 MMOL/L (ref 98–107)
CHOLEST SERPL-MCNC: 143 MG/DL (ref 0–199)
CHOLESTEROL/HDL RATIO: 2.3
CO2 SERPL-SCNC: 31 MMOL/L (ref 21–32)
CREAT SERPL-MCNC: 1.08 MG/DL (ref 0.5–1.3)
EGFRCR SERPLBLD CKD-EPI 2021: 72 ML/MIN/1.73M*2
EOSINOPHIL # BLD AUTO: 0.21 X10*3/UL (ref 0–0.4)
EOSINOPHIL NFR BLD AUTO: 3.3 %
ERYTHROCYTE [DISTWIDTH] IN BLOOD BY AUTOMATED COUNT: 15.3 % (ref 11.5–14.5)
GLUCOSE SERPL-MCNC: 105 MG/DL (ref 74–99)
HCT VFR BLD AUTO: 42.1 % (ref 41–52)
HDLC SERPL-MCNC: 63 MG/DL
HGB BLD-MCNC: 12.9 G/DL (ref 13.5–17.5)
IMM GRANULOCYTES # BLD AUTO: 0.08 X10*3/UL (ref 0–0.5)
IMM GRANULOCYTES NFR BLD AUTO: 1.2 % (ref 0–0.9)
LDLC SERPL CALC-MCNC: 66 MG/DL
LYMPHOCYTES # BLD AUTO: 1.71 X10*3/UL (ref 0.8–3)
LYMPHOCYTES NFR BLD AUTO: 26.7 %
MAGNESIUM SERPL-MCNC: 2.07 MG/DL (ref 1.6–2.4)
MCH RBC QN AUTO: 34.6 PG (ref 26–34)
MCHC RBC AUTO-ENTMCNC: 30.6 G/DL (ref 32–36)
MCV RBC AUTO: 113 FL (ref 80–100)
MONOCYTES # BLD AUTO: 0.79 X10*3/UL (ref 0.05–0.8)
MONOCYTES NFR BLD AUTO: 12.3 %
NEUTROPHILS # BLD AUTO: 3.53 X10*3/UL (ref 1.6–5.5)
NEUTROPHILS NFR BLD AUTO: 55.1 %
NON HDL CHOLESTEROL: 80 MG/DL (ref 0–149)
NRBC BLD-RTO: 0 /100 WBCS (ref 0–0)
OVALOCYTES BLD QL SMEAR: NORMAL
PLATELET # BLD AUTO: 182 X10*3/UL (ref 150–450)
POLYCHROMASIA BLD QL SMEAR: NORMAL
POTASSIUM SERPL-SCNC: 5.2 MMOL/L (ref 3.5–5.3)
PROT SERPL-MCNC: 6.7 G/DL (ref 6.4–8.2)
PSA SERPL-MCNC: 0.63 NG/ML
RBC # BLD AUTO: 3.73 X10*6/UL (ref 4.5–5.9)
RBC MORPH BLD: NORMAL
SODIUM SERPL-SCNC: 141 MMOL/L (ref 136–145)
TRIGL SERPL-MCNC: 68 MG/DL (ref 0–149)
VLDL: 14 MG/DL (ref 0–40)
WBC # BLD AUTO: 6.4 X10*3/UL (ref 4.4–11.3)

## 2024-06-18 PROCEDURE — 3008F BODY MASS INDEX DOCD: CPT | Performed by: FAMILY MEDICINE

## 2024-06-18 PROCEDURE — 80053 COMPREHEN METABOLIC PANEL: CPT

## 2024-06-18 PROCEDURE — 3074F SYST BP LT 130 MM HG: CPT | Performed by: FAMILY MEDICINE

## 2024-06-18 PROCEDURE — 3078F DIAST BP <80 MM HG: CPT | Performed by: FAMILY MEDICINE

## 2024-06-18 PROCEDURE — 83735 ASSAY OF MAGNESIUM: CPT

## 2024-06-18 PROCEDURE — 85025 COMPLETE CBC W/AUTO DIFF WBC: CPT

## 2024-06-18 PROCEDURE — 80061 LIPID PANEL: CPT

## 2024-06-18 PROCEDURE — 36415 COLL VENOUS BLD VENIPUNCTURE: CPT

## 2024-06-18 PROCEDURE — 1036F TOBACCO NON-USER: CPT | Performed by: FAMILY MEDICINE

## 2024-06-18 PROCEDURE — 1159F MED LIST DOCD IN RCRD: CPT | Performed by: FAMILY MEDICINE

## 2024-06-18 PROCEDURE — 1123F ACP DISCUSS/DSCN MKR DOCD: CPT | Performed by: FAMILY MEDICINE

## 2024-06-18 PROCEDURE — 1157F ADVNC CARE PLAN IN RCRD: CPT | Performed by: FAMILY MEDICINE

## 2024-06-18 PROCEDURE — G0103 PSA SCREENING: HCPCS

## 2024-06-18 PROCEDURE — 99214 OFFICE O/P EST MOD 30 MIN: CPT | Performed by: FAMILY MEDICINE

## 2024-06-18 ASSESSMENT — ENCOUNTER SYMPTOMS
OCCASIONAL FEELINGS OF UNSTEADINESS: 0
LOSS OF SENSATION IN FEET: 0
DEPRESSION: 0

## 2024-06-18 ASSESSMENT — PATIENT HEALTH QUESTIONNAIRE - PHQ9
2. FEELING DOWN, DEPRESSED OR HOPELESS: NOT AT ALL
1. LITTLE INTEREST OR PLEASURE IN DOING THINGS: NOT AT ALL
SUM OF ALL RESPONSES TO PHQ9 QUESTIONS 1 AND 2: 0

## 2024-06-20 LAB — PATH REVIEW-CBC DIFFERENTIAL: NORMAL

## 2024-09-05 DIAGNOSIS — E78.2 MIXED HYPERLIPIDEMIA: ICD-10-CM

## 2024-09-05 RX ORDER — SIMVASTATIN 40 MG/1
40 TABLET, FILM COATED ORAL NIGHTLY
Qty: 90 TABLET | Refills: 3 | Status: SHIPPED | OUTPATIENT
Start: 2024-09-05

## 2024-10-18 ENCOUNTER — APPOINTMENT (OUTPATIENT)
Dept: CARDIOLOGY | Facility: CLINIC | Age: 74
End: 2024-10-18
Payer: MEDICARE

## 2024-10-18 VITALS
HEART RATE: 63 BPM | SYSTOLIC BLOOD PRESSURE: 126 MMHG | DIASTOLIC BLOOD PRESSURE: 74 MMHG | WEIGHT: 198 LBS | HEIGHT: 70 IN | BODY MASS INDEX: 28.35 KG/M2

## 2024-10-18 DIAGNOSIS — I25.10 CAD S/P PERCUTANEOUS CORONARY ANGIOPLASTY: ICD-10-CM

## 2024-10-18 DIAGNOSIS — E78.2 MIXED HYPERLIPIDEMIA: ICD-10-CM

## 2024-10-18 DIAGNOSIS — Z76.89 ENCOUNTER TO ESTABLISH CARE: Primary | ICD-10-CM

## 2024-10-18 DIAGNOSIS — I10 BENIGN ESSENTIAL HYPERTENSION: ICD-10-CM

## 2024-10-18 DIAGNOSIS — Z79.899 HIGH RISK MEDICATION USE: ICD-10-CM

## 2024-10-18 DIAGNOSIS — Z71.89 ENCOUNTER TO DISCUSS TREATMENT OPTIONS: ICD-10-CM

## 2024-10-18 DIAGNOSIS — R06.02 SOB (SHORTNESS OF BREATH) ON EXERTION: ICD-10-CM

## 2024-10-18 DIAGNOSIS — Z71.89 ENCOUNTER FOR MEDICATION REVIEW AND COUNSELING: ICD-10-CM

## 2024-10-18 DIAGNOSIS — Z98.61 CAD S/P PERCUTANEOUS CORONARY ANGIOPLASTY: ICD-10-CM

## 2024-10-18 DIAGNOSIS — Z87.891 FORMER SMOKER: ICD-10-CM

## 2024-10-18 DIAGNOSIS — I48.11 LONGSTANDING PERSISTENT ATRIAL FIBRILLATION (MULTI): ICD-10-CM

## 2024-10-18 ASSESSMENT — ENCOUNTER SYMPTOMS
DYSPNEA ON EXERTION: 0
PARESTHESIAS: 1
PALPITATIONS: 0

## 2024-10-18 NOTE — PATIENT INSTRUCTIONS
Continue same medications/treatment.  Patient educated on proper medication use.  Patient educated on risk factor modification.  Please bring any lab results from other providers/physicians to your next appointment.    Please bring all medicines, vitamins, and herbal supplements with you when you come to the office.    Prescriptions will not be filled unless you are compliant with your follow up appointments or have a follow up appointment scheduled as per instruction of your physician. Refills should be requested at the time of your visit.    Follow up with Dr. Peralta in 4 months with EKG. Cancel December appointment.  Follow up with Dr. Perry in 1 year with EKG    I, FRANCISCO JAVIER MIN, RN, AM SCRIBING FOR AND IN THE PRESENCE OF DR. NAN PERRY MD, FACC, FACP, FHRS

## 2024-10-18 NOTE — PROGRESS NOTES
"Referred by Kalee Allen NP for Establishing care .     History Of Present Illness:    Gutierrez Aldana is a 74 y.o. male presenting with [facility] care.    Patient denies any arrhythmia symptoms of palpitation, lightheadedness, near syncope, or syncope.    He follows with Fabian Meza twice a year    Past Medical History:  See List    Past Surgical History:  See List      Social History:  He reports that he quit smoking about 15 years ago. His smoking use included cigarettes. He started smoking about 59 years ago. He has a 88 pack-year smoking history. He has never used smokeless tobacco. He reports current alcohol use of about 6.0 standard drinks of alcohol per week. He reports that he does not use drugs.    Family History:  Family History   Problem Relation Name Age of Onset    Cancer Mother      Atrial fibrillation Sister      Other (CARDIAC PACEMAKER) Sister          Allergies:  Patient has no known allergies.    Outpatient Medications:  Current Outpatient Medications   Medication Instructions    acetaminophen (Tylenol) 325 mg capsule Take by mouth.    aspirin 81 mg EC tablet 1 tablet, 2 times daily    dofetilide (TIKOSYN) 250 mcg, oral, 2 times daily    Eliquis 5 mg, oral, 2 times daily    gabapentin (NEURONTIN) 100 mg, oral    lisinopril 20 mg tablet Take 1 tablet in am and 2 tablets in pm    methylcellulose oral powder Daily    metoprolol tartrate (LOPRESSOR) 25 mg, oral, 2 times daily    nitroglycerin (NITROSTAT) 0.4 mg, Every 5 min PRN    simvastatin (ZOCOR) 40 mg, oral, Nightly    vit A,C and E-lutein-minerals (Ocuvite with Lutein) 300 mcg-200 mg-27 mg-2 mg tablet 1 tablet, Daily        Last Recorded Vitals:  Vitals:    10/18/24 1316   BP: 158/80   BP Location: Left arm   Patient Position: Sitting   Pulse: 63   Weight: 89.8 kg (198 lb)   Height: 1.765 m (5' 9.5\")   Review of Systems   Cardiovascular:  Negative for chest pain, dyspnea on exertion and palpitations.   Neurological:  Positive for " paresthesias.        Arm   All other systems reviewed and are negative.        Physical Exam:  Constitutional:       General: Awake.      Appearance: Normal and healthy appearance. Well-developed and not in distress.   Neck:      Vascular: No JVR. JVD normal.   Pulmonary:      Effort: Pulmonary effort is normal.      Breath sounds: Normal breath sounds. No wheezing. No rhonchi. No rales.   Chest:      Chest wall: Not tender to palpatation.   Cardiovascular:      PMI at left midclavicular line. Normal rate. Regular rhythm. Normal S1. Normal S2.       Murmurs: There is no murmur.      No gallop.  No click. No rub.   Pulses:     Intact distal pulses.   Edema:     Peripheral edema absent.   Abdominal:      Tenderness: There is no abdominal tenderness.   Musculoskeletal: Normal range of motion.         General: No tenderness. Skin:     General: Skin is warm and dry.   Neurological:      General: No focal deficit present.      Mental Status: Alert and oriented to person, place and time.              Last Labs:  CBC -  Lab Results   Component Value Date    WBC 6.4 06/18/2024    HGB 12.9 (L) 06/18/2024    HCT 42.1 06/18/2024     (H) 06/18/2024     06/18/2024       CMP -  Lab Results   Component Value Date    CALCIUM 9.8 06/18/2024    PROT 6.7 06/18/2024    ALBUMIN 4.4 06/18/2024    AST 17 06/18/2024    ALT 15 06/18/2024    ALKPHOS 52 06/18/2024    BILITOT 0.4 06/18/2024       LIPID PANEL -   Lab Results   Component Value Date    CHOL 143 06/18/2024    TRIG 68 06/18/2024    HDL 63.0 06/18/2024    CHHDL 2.3 06/18/2024    LDLF 67 06/06/2023    VLDL 14 06/18/2024    NHDL 80 06/18/2024       RENAL FUNCTION PANEL -   Lab Results   Component Value Date    GLUCOSE 105 (H) 06/18/2024     06/18/2024    K 5.2 06/18/2024     06/18/2024    CO2 31 06/18/2024    ANIONGAP 10 06/18/2024    BUN 18 06/18/2024    CREATININE 1.08 06/18/2024    GFRMALE 81 06/06/2023    CALCIUM 9.8 06/18/2024    ALBUMIN 4.4 06/18/2024         Lab Results   Component Value Date     (H) 10/15/2022       Last Cardiology Tests:  ECG:  ECG 12 lead 10/04/2023    Today.  Normal sinus rhythm.  First-degree AV block.  Right axis deviation.  Corrected, alternating visits with Dr. Pardo 420 ms.  First-degree AV block dates at or before 2020 by review of chart ECG's      Stress Test:  Nuclear Stress Test 09/25/2023        Lab review: I have personally reviewed the laboratory result(s) see above    Assessment/Plan   Diagnoses and all orders for this visit:  Benign essential hypertension  Longstanding persistent atrial fibrillation (Multi)  High risk medication use  Mixed hyperlipidemia  CAD S/P percutaneous coronary angioplasty  Former smoker  SOB (shortness of breath) on exertion  BMI 28.0-28.9,adult  Encounter for medication review and counseling  Encounter to discuss treatment options    Persistent atrial fibrillation.  On rhythm control anticoagulation strategy.  Reviewed meds.  Continue meds.  Refills.  High risk medication dofetilide.  Will alternate visits with Dr. Peralta for ECGs.  Will stagger visits between EP and cardiology.  High risk medication Eliquis.  Tolerates anticoagulation.  Continue long-term  Coronary disease.  Status post remote PCI stent to proximal LAD in 2011.  Stress test in 2023 with LVEF 74% and no ischemia no infarct.  Mild valvular heart disease with mild TR and PI by echocardiogram 2021.  Pulmonary nodules by CT scan 2017  First-degree AV block and likely some mild sinus node dysfunction by chart review.  Asymptomatic.  No invasive EP evaluation indicated  Other medical issues of hypertension, hyperlipidemia, PAD, and overweight noted.  Repeated blood pressure as above  American Heart Association recommendations for exercise and diet reviewed    Counseling greater than 50% of the visit formed.  The patient and I discussed normal conduction, atrial fibrillation, first-degree AV block, review of serial ECGs, last stress  test results, obtaining ECG twice yearly given antiarrhythmic medication monitoring, alternating visits with cardiology at 6-month basis, treatment options, risk, benefits, and imponderables.  All questions answered in detail.  Patient appreciative care.

## 2024-11-07 ENCOUNTER — OFFICE VISIT (OUTPATIENT)
Age: 74
End: 2024-11-07
Payer: MEDICARE

## 2024-11-07 VITALS
HEIGHT: 69 IN | WEIGHT: 195 LBS | DIASTOLIC BLOOD PRESSURE: 72 MMHG | BODY MASS INDEX: 28.88 KG/M2 | SYSTOLIC BLOOD PRESSURE: 126 MMHG | TEMPERATURE: 97.4 F

## 2024-11-07 DIAGNOSIS — M47.812 CERVICAL SPONDYLOSIS WITHOUT MYELOPATHY: Primary | ICD-10-CM

## 2024-11-07 PROCEDURE — 99215 OFFICE O/P EST HI 40 MIN: CPT | Performed by: PAIN MEDICINE

## 2024-11-07 PROCEDURE — 1125F AMNT PAIN NOTED PAIN PRSNT: CPT | Performed by: PAIN MEDICINE

## 2024-11-07 PROCEDURE — G8484 FLU IMMUNIZE NO ADMIN: HCPCS | Performed by: PAIN MEDICINE

## 2024-11-07 PROCEDURE — G8419 CALC BMI OUT NRM PARAM NOF/U: HCPCS | Performed by: PAIN MEDICINE

## 2024-11-07 PROCEDURE — 1123F ACP DISCUSS/DSCN MKR DOCD: CPT | Performed by: PAIN MEDICINE

## 2024-11-07 PROCEDURE — 3017F COLORECTAL CA SCREEN DOC REV: CPT | Performed by: PAIN MEDICINE

## 2024-11-07 PROCEDURE — G8427 DOCREV CUR MEDS BY ELIG CLIN: HCPCS | Performed by: PAIN MEDICINE

## 2024-11-07 PROCEDURE — 1159F MED LIST DOCD IN RCRD: CPT | Performed by: PAIN MEDICINE

## 2024-11-07 PROCEDURE — 1036F TOBACCO NON-USER: CPT | Performed by: PAIN MEDICINE

## 2024-11-07 PROCEDURE — 99214 OFFICE O/P EST MOD 30 MIN: CPT | Performed by: PAIN MEDICINE

## 2024-11-07 RX ORDER — METOPROLOL TARTRATE 25 MG/1
25 TABLET, FILM COATED ORAL 2 TIMES DAILY
COMMUNITY

## 2024-11-07 RX ORDER — DOFETILIDE 0.25 MG/1
250 CAPSULE ORAL 2 TIMES DAILY
COMMUNITY

## 2024-11-07 RX ORDER — LISINOPRIL 20 MG/1
TABLET ORAL
COMMUNITY
Start: 2024-09-05 | End: 2024-11-07

## 2024-11-07 RX ORDER — SIMVASTATIN 40 MG
40 TABLET ORAL NIGHTLY
COMMUNITY
Start: 2024-09-05

## 2024-11-07 NOTE — PROGRESS NOTES
[facility]  [facility]  P: [phone]  F: [phone]        Esvin Dasilva  (1950)    11/7/2024    Subjective:     Esvin Dasilva is 74 y.o. male who complains today of:     Chief Complaint   Patient presents with    Follow-up    Neck Pain    Back Pain   Patient here today for follow-up.  His neck pain is returned with a vengeance.  Previous RF ablation done cervical spine helped over 50% done 6 months ago.  He also has increasing left-sided low back pain.  He is on Eliquis.  He cannot take anti-inflammatories.  He has tried Tylenol.  Turning twisting looking up and down bothers his neck achy sharp pain with spasms.  Pain affects his quality life.  Yardwork can be difficult.    Assessment: Cervical spondylosis, lumbar spondylosis, chronic pain syndrome    Plan: We discussed options.  NRS pain level 9 out of 10.  I like to authorize bilateral C4-5-6 cervical RF ablation under fluoroscopic guidance.  Previous RF ablation helped over 50% done 6 months ago.  Patient will stop Eliquis 3 days prior to procedure.  OARRS was reviewed.  Narcotics not needed.  Questions answered chart was reviewed.  He has failed conservative treatment.  He understands a plan is in agreement.      Allergies:  Patient has no known allergies.    No past medical history on file.  No past surgical history on file.  No family history on file.  Social History     Socioeconomic History    Marital status:      Spouse name: Not on file    Number of children: Not on file    Years of education: Not on file    Highest education level: Not on file   Occupational History    Not on file   Tobacco Use    Smoking status: Former    Smokeless tobacco: Never   Substance and Sexual Activity    Alcohol use: Yes     Alcohol/week: 9.0 standard drinks of alcohol     Types: 9 Glasses of wine per week    Drug use: Not on file    Sexual activity: Not on file   Other Topics Concern

## 2024-11-08 ENCOUNTER — TELEPHONE (OUTPATIENT)
Age: 74
End: 2024-11-08

## 2024-11-08 NOTE — TELEPHONE ENCOUNTER
Patient spoke with Call Center today.  He rates pain 9/10 on NRS Scale before 4- Cervical RFA.  He rates pain 9/10 on NRS Scale before 10- Cervical RFA.  He rates pain 10/10 on NRS Scale before 4- Cervical RFA.  Patient states he had about 50% relief for about 6 months after each Cervical RFA.

## 2024-11-08 NOTE — TELEPHONE ENCOUNTER
Left message on personal voicemail for patient to call.  Medicare requires specific information to approve Cervical Radiofrequency Ablation ordered by Dr. Nixon.  We need to know:    1.) Patient's pain rating (0-10 with 10 being worst) BEFORE each Cervical RFA on    1.)  4-   2.)  10-   3.)  4-    2.) Would patient say they had at least 50% relief for 6 months after each RFA?

## 2024-11-08 NOTE — TELEPHONE ENCOUNTER
Patient called back  4/29 : 9  10/16/23: 9  4/10/23: 10    Patient also stated that he had about 50% relief  for about 6 months after each RFA

## 2024-11-12 ENCOUNTER — TELEPHONE (OUTPATIENT)
Age: 74
End: 2024-11-12

## 2024-11-12 NOTE — TELEPHONE ENCOUNTER
Bilateral C456 RFA approved 11- to 3-.  Referral #[id number]    OK to schedule procedure approved as above.   Please note sides/levels approved and date range.   (If applicable, sides/levels approved may differ from those ordered)    To be scheduled with Dr. Nixon.

## 2024-11-25 ENCOUNTER — OFFICE VISIT (OUTPATIENT)
Age: 74
End: 2024-11-25
Payer: MEDICARE

## 2024-11-25 VITALS
HEIGHT: 70 IN | HEART RATE: 51 BPM | DIASTOLIC BLOOD PRESSURE: 60 MMHG | WEIGHT: 195 LBS | BODY MASS INDEX: 27.92 KG/M2 | OXYGEN SATURATION: 97 % | SYSTOLIC BLOOD PRESSURE: 136 MMHG | TEMPERATURE: 97.1 F

## 2024-11-25 DIAGNOSIS — M47.812 CERVICAL SPONDYLOSIS WITHOUT MYELOPATHY: Primary | ICD-10-CM

## 2024-11-25 DIAGNOSIS — M47.817 LUMBOSACRAL SPONDYLOSIS WITHOUT MYELOPATHY: ICD-10-CM

## 2024-11-25 PROCEDURE — 64633 DESTROY CERV/THOR FACET JNT: CPT | Performed by: PAIN MEDICINE

## 2024-11-25 PROCEDURE — 64634 DESTROY C/TH FACET JNT ADDL: CPT | Performed by: PAIN MEDICINE

## 2024-11-25 PROCEDURE — 99213 OFFICE O/P EST LOW 20 MIN: CPT | Performed by: PAIN MEDICINE

## 2024-11-25 RX ORDER — LIDOCAINE HYDROCHLORIDE 10 MG/ML
3 INJECTION, SOLUTION EPIDURAL; INFILTRATION; INTRACAUDAL; PERINEURAL ONCE
Status: COMPLETED | OUTPATIENT
Start: 2024-11-25 | End: 2024-11-25

## 2024-11-25 RX ORDER — DEXAMETHASONE SODIUM PHOSPHATE 10 MG/ML
10 INJECTION, SOLUTION INTRAMUSCULAR; INTRAVENOUS ONCE
Status: COMPLETED | OUTPATIENT
Start: 2024-11-25 | End: 2024-11-25

## 2024-11-25 RX ADMIN — LIDOCAINE HYDROCHLORIDE 3 ML: 10 INJECTION, SOLUTION EPIDURAL; INFILTRATION; INTRACAUDAL; PERINEURAL at 11:31

## 2024-11-25 RX ADMIN — DEXAMETHASONE SODIUM PHOSPHATE 10 MG: 10 INJECTION INTRAMUSCULAR; INTRAVENOUS at 11:30

## 2024-11-25 ASSESSMENT — PAIN DESCRIPTION - LOCATION: LOCATION: NECK

## 2024-11-25 ASSESSMENT — PAIN SCALES - GENERAL: PAINLEVEL_OUTOF10: 8

## 2024-11-25 NOTE — PROGRESS NOTES
[facility]  Neurosurgery and Pain Management Center  P: [phone]  F: [phone]        Esvin Dasilva  (1950)    11/25/2024    Subjective:     Esvin Dasilva is 74 y.o. male who complains today of:     Chief complaint left-sided low back pain    Patient here today for follow-up.  Patient having increasing low back pain left side worse with raking weeds bending activity.  Much better with sitting.  The pain affects his quality life.  Achy sharp pain with spasms.  No new weakness.    Assessment: Lumbar spondylosis    Plan: Discussed options.  NRS pain level can be 8 out of 10.  I like to authorize left L3-4-5 radiofrequency ablation under fluoroscopic guidance.  Previous radiofrequency ablation helped over 50% done 6 months ago.  He has failed conservative treatment.  Questions answered chart was reviewed.      Allergies:  Patient has no known allergies.    No past medical history on file.  No past surgical history on file.  No family history on file.  Social History     Socioeconomic History    Marital status:      Spouse name: Not on file    Number of children: Not on file    Years of education: Not on file    Highest education level: Not on file   Occupational History    Not on file   Tobacco Use    Smoking status: Former    Smokeless tobacco: Never   Substance and Sexual Activity    Alcohol use: Yes     Alcohol/week: 9.0 standard drinks of alcohol     Types: 9 Glasses of wine per week    Drug use: Not on file    Sexual activity: Not on file   Other Topics Concern    Not on file   Social History Narrative    Not on file     Social Determinants of Health     Financial Resource Strain: Not on file   Food Insecurity: Not on file   Transportation Needs: Not on file   Physical Activity: Not on file   Stress: Not on file   Social Connections: Not on file   Intimate Partner Violence: Not on file   Housing Stability: Not on file       Current Outpatient Medications

## 2024-11-25 NOTE — PROGRESS NOTES
Procedure: Bilateral C4-C5-C6 cervical radiofrequency medial branch ablation using fluoroscopic needle guidance.    Timeout taken to identify correct patient, procedure and side.    Patient lying in the prone position the patient was prepped and draped in the usual sterile fashion using Betadine.  The levels were determined under fluoroscopy.  1% preservative-free lidocaine was used to numb the skin using a 27-gauge 1-1/2 inch needle.  Radiofrequency needle was introduced to the anatomic location of the medial branch at the lateral masses utilizing intermittent fluoroscopy.  Motor stimulation up to 2 V was done to confirm no ablation of the ventral ramus at each level.  Prior to lesioning at 80 °C for 90 seconds 1.5 mL of 1% preservative-free lidocaine along with 10 mg dexamethasone preservative-free was divided equally amongst the levels and injected with negative aspiration.  This was done at each level.    The procedure was tolerated well without complications.  The patient was monitored after procedure, had their usual motor strength.  And discharged home in stable condition.  Patient will ice the area and  take it easy.  All questions answered, chart was reviewed.

## 2024-11-26 ENCOUNTER — TELEPHONE (OUTPATIENT)
Age: 74
End: 2024-11-26

## 2024-11-26 NOTE — TELEPHONE ENCOUNTER
Left message on personal voicemail for patient to call.  Medicare requires specific information to approve Lumbar Radiofrequency Ablation ordered by Dr. Nixon.  We need to know:     1.) Patient's pain rating (0-10 with 10 being worst) BEFORE each Lumbar RFA on    1.)  4-   2.)  10-   3.)  4-3-2023    2.) What percentage of relief did patient have and for how long after each RFA?

## 2024-11-26 NOTE — TELEPHONE ENCOUNTER
Call Center spoke with patient today.  He rates pain before 4- Lumbar RFA  9/10 on NRS Scale and received 75% relief for 5 months.  He rates pain before 10- Lumbar RFA  9/10 on NRS Scale and received 75% relief for 5 months.  He rates pain before 4-3-2023 Lumbar RFA 9/10 on NRS Scale and received 75% relief for 5 months.

## 2024-11-26 NOTE — TELEPHONE ENCOUNTER
4/30/2024  BEFORE 9 - PERCENTAGE OF RELIEF AFTER 75% FOR 5 MONTH ES  10/23/2023  BEFORE 9- PERCENTAGE OF RELIEF AFTER 75% FOR  5 MONTHES  4/3/2024  BEFORE 9- PERCENTAGE OF RELIEF AFTER 75% FOR 5 MONTHES

## 2024-11-27 ENCOUNTER — TELEPHONE (OUTPATIENT)
Age: 74
End: 2024-11-27

## 2024-11-27 NOTE — TELEPHONE ENCOUNTER
REFERRAL # [id number]    LEFT L3-4-5 RFA     AUTH FROM 12/10/24-3/10/25    OK to schedule procedure approved as above.   Please note sides/levels approved and date range.   (If applicable, sides/levels approved may differ from those ordered)    TO BE SCHEDULED WITH DR TRIPLETT

## 2024-11-27 NOTE — TELEPHONE ENCOUNTER
LMOM for a return call to schedule RFA with Dr. Nixon  LEFT L3-4-5 RFA      AUTH FROM 12/10/24-3/10/25

## 2024-12-02 DIAGNOSIS — I10 BENIGN ESSENTIAL HYPERTENSION: ICD-10-CM

## 2024-12-02 RX ORDER — LISINOPRIL 20 MG/1
TABLET ORAL
Qty: 270 TABLET | Refills: 3 | Status: SHIPPED | OUTPATIENT
Start: 2024-12-02

## 2024-12-02 NOTE — TELEPHONE ENCOUNTER
Received request for prescription refills for patient.   Patient follows with     Request is for   Is patient currently on medication     Last OV 6/3/24  Next OV 2/18/25    Pended for signing and sent to provider

## 2024-12-03 ENCOUNTER — APPOINTMENT (OUTPATIENT)
Dept: CARDIOLOGY | Facility: CLINIC | Age: 74
End: 2024-12-03
Payer: MEDICARE

## 2024-12-05 DIAGNOSIS — Z98.61 CAD S/P PERCUTANEOUS CORONARY ANGIOPLASTY: ICD-10-CM

## 2024-12-05 DIAGNOSIS — I25.10 CAD S/P PERCUTANEOUS CORONARY ANGIOPLASTY: ICD-10-CM

## 2024-12-05 RX ORDER — NITROGLYCERIN 0.4 MG/1
TABLET SUBLINGUAL
Qty: 25 TABLET | Refills: 5 | Status: SHIPPED | OUTPATIENT
Start: 2024-12-05

## 2024-12-05 NOTE — TELEPHONE ENCOUNTER
Received request for prescription refill for patient.  Patient follows with Dr. Javed Peralta, DO, [facility]     Request is for nitroglycerin   Is patient currently on medication- yes    Last OV- 6/3/24  Next OV- 2/18/25    Pended for signing and sent to provider.

## 2024-12-10 ENCOUNTER — OFFICE VISIT (OUTPATIENT)
Age: 74
End: 2024-12-10
Payer: MEDICARE

## 2024-12-10 VITALS
WEIGHT: 195 LBS | BODY MASS INDEX: 28.88 KG/M2 | SYSTOLIC BLOOD PRESSURE: 118 MMHG | HEIGHT: 69 IN | OXYGEN SATURATION: 98 % | DIASTOLIC BLOOD PRESSURE: 64 MMHG | HEART RATE: 67 BPM | TEMPERATURE: 97.5 F

## 2024-12-10 DIAGNOSIS — M47.817 LUMBOSACRAL SPONDYLOSIS WITHOUT MYELOPATHY: Primary | ICD-10-CM

## 2024-12-10 PROCEDURE — 64636 DESTROY L/S FACET JNT ADDL: CPT | Performed by: PAIN MEDICINE

## 2024-12-10 PROCEDURE — 64635 DESTROY LUMB/SAC FACET JNT: CPT | Performed by: PAIN MEDICINE

## 2024-12-10 RX ORDER — LIDOCAINE HYDROCHLORIDE 10 MG/ML
3 INJECTION, SOLUTION EPIDURAL; INFILTRATION; INTRACAUDAL; PERINEURAL ONCE
Status: COMPLETED | OUTPATIENT
Start: 2024-12-10 | End: 2024-12-10

## 2024-12-10 RX ORDER — BETAMETHASONE SODIUM PHOSPHATE AND BETAMETHASONE ACETATE 3; 3 MG/ML; MG/ML
6 INJECTION, SUSPENSION INTRA-ARTICULAR; INTRALESIONAL; INTRAMUSCULAR; SOFT TISSUE ONCE
Status: COMPLETED | OUTPATIENT
Start: 2024-12-10 | End: 2024-12-10

## 2024-12-10 RX ADMIN — LIDOCAINE HYDROCHLORIDE 3 ML: 10 INJECTION, SOLUTION EPIDURAL; INFILTRATION; INTRACAUDAL; PERINEURAL at 10:35

## 2024-12-10 RX ADMIN — BETAMETHASONE SODIUM PHOSPHATE AND BETAMETHASONE ACETATE 6 MG: 3; 3 INJECTION, SUSPENSION INTRA-ARTICULAR; INTRALESIONAL; INTRAMUSCULAR at 10:34

## 2024-12-10 ASSESSMENT — PAIN DESCRIPTION - LOCATION
LOCATION: BACK
LOCATION: BACK

## 2024-12-10 ASSESSMENT — PAIN SCALES - GENERAL
PAINLEVEL_OUTOF10: 7
PAINLEVEL_OUTOF10: 7

## 2024-12-10 NOTE — PROGRESS NOTES
[facility]  Neurosurgery and Pain Management Center  [address], [address]  [place], [place]  P: [phone]  F: [phone]      Lumbar Radio Frequency Ablation     Provider: CECILY TRIPLETT DO          Patient Name: Esvin Dasilva : 1950        Date: 12/10/2024      Esvin Dasilva is here today for interventional pain management.  Standard ASI guidelines were followed and sterile technique used.  Area was cleaned with Betadine x3.  Informed consent was obtained.  Fluoroscopic guidance was used for this procedure. Multiple views of fluoroscopy were used during procedure to assist with needle placement. Appropriate sized RF 10mm active tip needle was used and advance to appropriate anatomic location.    There was appropriate multifidus contraction noted with motor stimulation at 2 Hz between 0.5-1.5 volts. No limb or gluteal contraction was noted taking it up to 3.5 volts. Prior to lesioning at 80 degrees Celsius for 90 seconds, approximately 0.75mg/1mg of Celestone and ½ cc of 1% preservative free Lidocaine was injected. Impedance was between 200-500 ohms during the procedure.     Patient tolerated the procedure well, no obvious complications occurred during the procedure.  Patient was appropriately monitored and discharged home in stable condition with their usual motor strength. Post Op instructions were given to patient.          [] Bilateral [] T11 [] L1 [] S1     [] T12 [] L2 [] S2    [] Right  [x] L3 [] S3      [x] L4 [] S4    [x] Left  [x] L5                              CECILY TRIPLETT DO

## 2024-12-16 ENCOUNTER — APPOINTMENT (OUTPATIENT)
Dept: PRIMARY CARE | Facility: CLINIC | Age: 74
End: 2024-12-16
Payer: MEDICARE

## 2024-12-16 VITALS
RESPIRATION RATE: 16 BRPM | BODY MASS INDEX: 29.03 KG/M2 | SYSTOLIC BLOOD PRESSURE: 124 MMHG | OXYGEN SATURATION: 98 % | DIASTOLIC BLOOD PRESSURE: 66 MMHG | WEIGHT: 202.8 LBS | HEIGHT: 70 IN | TEMPERATURE: 97.2 F | HEART RATE: 67 BPM

## 2024-12-16 DIAGNOSIS — I10 BENIGN ESSENTIAL HYPERTENSION: ICD-10-CM

## 2024-12-16 DIAGNOSIS — Z13.6 SCREENING FOR AAA (ABDOMINAL AORTIC ANEURYSM): ICD-10-CM

## 2024-12-16 DIAGNOSIS — J43.1 PANLOBULAR EMPHYSEMA (MULTI): ICD-10-CM

## 2024-12-16 DIAGNOSIS — Z00.00 ENCOUNTER FOR SUBSEQUENT ANNUAL WELLNESS VISIT (AWV) IN MEDICARE PATIENT: Primary | ICD-10-CM

## 2024-12-16 DIAGNOSIS — Z00.00 ROUTINE GENERAL MEDICAL EXAMINATION AT HEALTH CARE FACILITY: ICD-10-CM

## 2024-12-16 DIAGNOSIS — M19.90 ARTHRITIS: ICD-10-CM

## 2024-12-16 DIAGNOSIS — I48.11 LONGSTANDING PERSISTENT ATRIAL FIBRILLATION (MULTI): ICD-10-CM

## 2024-12-16 PROCEDURE — 1157F ADVNC CARE PLAN IN RCRD: CPT | Performed by: FAMILY MEDICINE

## 2024-12-16 PROCEDURE — 3078F DIAST BP <80 MM HG: CPT | Performed by: FAMILY MEDICINE

## 2024-12-16 PROCEDURE — 3074F SYST BP LT 130 MM HG: CPT | Performed by: FAMILY MEDICINE

## 2024-12-16 PROCEDURE — 99214 OFFICE O/P EST MOD 30 MIN: CPT | Performed by: FAMILY MEDICINE

## 2024-12-16 PROCEDURE — 1160F RVW MEDS BY RX/DR IN RCRD: CPT | Performed by: FAMILY MEDICINE

## 2024-12-16 PROCEDURE — 1036F TOBACCO NON-USER: CPT | Performed by: FAMILY MEDICINE

## 2024-12-16 PROCEDURE — G0439 PPPS, SUBSEQ VISIT: HCPCS | Performed by: FAMILY MEDICINE

## 2024-12-16 PROCEDURE — 1170F FXNL STATUS ASSESSED: CPT | Performed by: FAMILY MEDICINE

## 2024-12-16 PROCEDURE — 3008F BODY MASS INDEX DOCD: CPT | Performed by: FAMILY MEDICINE

## 2024-12-16 PROCEDURE — 1159F MED LIST DOCD IN RCRD: CPT | Performed by: FAMILY MEDICINE

## 2024-12-16 PROCEDURE — 1123F ACP DISCUSS/DSCN MKR DOCD: CPT | Performed by: FAMILY MEDICINE

## 2024-12-16 ASSESSMENT — ACTIVITIES OF DAILY LIVING (ADL)
BATHING: INDEPENDENT
TAKING_MEDICATION: INDEPENDENT
GROCERY_SHOPPING: INDEPENDENT
DRESSING: INDEPENDENT
MANAGING_FINANCES: INDEPENDENT
DOING_HOUSEWORK: INDEPENDENT

## 2024-12-16 ASSESSMENT — ENCOUNTER SYMPTOMS
DEPRESSION: 0
OCCASIONAL FEELINGS OF UNSTEADINESS: 0
LOSS OF SENSATION IN FEET: 0

## 2024-12-16 ASSESSMENT — PATIENT HEALTH QUESTIONNAIRE - PHQ9
2. FEELING DOWN, DEPRESSED OR HOPELESS: MORE THAN HALF THE DAYS
1. LITTLE INTEREST OR PLEASURE IN DOING THINGS: NOT AT ALL
10. IF YOU CHECKED OFF ANY PROBLEMS, HOW DIFFICULT HAVE THESE PROBLEMS MADE IT FOR YOU TO DO YOUR WORK, TAKE CARE OF THINGS AT HOME, OR GET ALONG WITH OTHER PEOPLE: NOT DIFFICULT AT ALL
SUM OF ALL RESPONSES TO PHQ9 QUESTIONS 1 AND 2: 2

## 2024-12-16 NOTE — PROGRESS NOTES
"Subjective   Patient ID: Gutierrez Aldana is an 74 y.o. male who is presenting today for a Medicare Annual Wellness Visit Subsequent, Hypertension, Hyperlipidemia, Arthritis, and Obesity .  This patient is due for wellness exam.  Hypertension, reports no side effects to prescribed medication. The patient reports good compliance with the medication. The patient is trying to follow a low-salt diet.  COPD: This patient does have some difficulties with breathing although fairly well-controlled with inhaler use.  Atrial fibrillation: there have been no palpitations, no shortness of breath, and no activity induced chest pain. There is no excessive bruising or bleeding with anticoagulation.  Patient is on Eliquis for anticoagulation and Tikosyn for rate and rhythm control.    The patients living will is active. His POA is daughterMichelle back-up POA is daughter Maryann Aldana.  The patients current code status is DNR.    Encounter for subsequent AWV: Medicare wellness visit done, patient is in satisfactory living circumstances where the patient's needs are met.  This patient is advised to develop or update their living will and provide us a copy for the chart.      The patient denies having the following symptoms: chest pain, chest pressure, fever, chills, N/V/D, constipation, dizziness, headaches, SOB.    Objective   Vitals:  /66   Pulse 67   Temp 36.2 °C (97.2 °F)   Resp 16   Ht 1.765 m (5' 9.5\")   Wt 92 kg (202 lb 12.8 oz)   SpO2 98%   BMI 29.52 kg/m²       Physical Exam  Vitals reviewed.   Constitutional:       Appearance: Normal appearance.   Neck:      Vascular: No carotid bruit.   Cardiovascular:      Rate and Rhythm: Normal rate and regular rhythm.      Pulses: Normal pulses.      Heart sounds: Normal heart sounds.   Pulmonary:      Effort: Pulmonary effort is normal. No respiratory distress.      Breath sounds: Normal breath sounds. No wheezing.   Abdominal:      General: There is no " distension.      Palpations: Abdomen is soft. There is no mass.      Tenderness: There is no abdominal tenderness. There is no right CVA tenderness, left CVA tenderness, guarding or rebound.   Musculoskeletal:      Cervical back: Normal range of motion and neck supple. No rigidity.      Right lower leg: No edema.      Left lower leg: No edema.   Lymphadenopathy:      Cervical: No cervical adenopathy.   Neurological:      Mental Status: He is alert.     Labs reviewed from 6/18/2024:              CMP, CBC, Lipid.    Assessment/Plan   Problem List Items Addressed This Visit       Longstanding persistent atrial fibrillation (Multi)    Benign essential hypertension    Relevant Orders    CBC and Auto Differential    Comprehensive Metabolic Panel    Lipid Panel    Magnesium    Follow Up In [facility] - PCP - Established    Arthritis    Encounter for subsequent annual wellness visit (AWV) in Medicare patient - Primary    Current Assessment & Plan     Medicare wellness visit done, patient is in satisfactory living circumstances where the patient's needs are met.  This patient is advised to develop or update their living will and provide us a copy for the chart.         Panlobular emphysema (Multi)     Other Visit Diagnoses       Screening for AAA (abdominal aortic aneurysm)        Relevant Orders    Vascular US Abdominal Aorta Aneurysm AAA Screening    Routine general medical examination at health care facility        Relevant Orders    1 Year Follow Up In [facility] - PCP - Wellness Exam        The above diagnoses are stable or well-controlled and we will continue with the current treatments unless noted above.    Follow-up in July for routine follow-up with labs prior and other follow-up as needed.

## 2024-12-18 ENCOUNTER — APPOINTMENT (OUTPATIENT)
Dept: PRIMARY CARE | Facility: CLINIC | Age: 74
End: 2024-12-18
Payer: MEDICARE

## 2024-12-18 ENCOUNTER — HOSPITAL ENCOUNTER (OUTPATIENT)
Dept: RADIOLOGY | Facility: HOSPITAL | Age: 74
Discharge: HOME | End: 2024-12-18
Payer: MEDICARE

## 2024-12-18 DIAGNOSIS — Z13.6 SCREENING FOR AAA (ABDOMINAL AORTIC ANEURYSM): ICD-10-CM

## 2024-12-18 PROCEDURE — 76706 US ABDL AORTA SCREEN AAA: CPT | Performed by: RADIOLOGY

## 2024-12-18 PROCEDURE — 76706 US ABDL AORTA SCREEN AAA: CPT

## 2025-02-18 ENCOUNTER — APPOINTMENT (OUTPATIENT)
Dept: CARDIOLOGY | Facility: CLINIC | Age: 75
End: 2025-02-18
Payer: MEDICARE

## 2025-02-18 VITALS
HEIGHT: 70 IN | WEIGHT: 202.1 LBS | SYSTOLIC BLOOD PRESSURE: 124 MMHG | HEART RATE: 58 BPM | BODY MASS INDEX: 28.93 KG/M2 | DIASTOLIC BLOOD PRESSURE: 68 MMHG

## 2025-02-18 DIAGNOSIS — I70.213 ATHEROSCLEROSIS OF NATIVE ARTERY OF BOTH LOWER EXTREMITIES WITH INTERMITTENT CLAUDICATION (CMS-HCC): ICD-10-CM

## 2025-02-18 DIAGNOSIS — E78.2 MIXED HYPERLIPIDEMIA: ICD-10-CM

## 2025-02-18 DIAGNOSIS — I10 BENIGN ESSENTIAL HYPERTENSION: ICD-10-CM

## 2025-02-18 DIAGNOSIS — Z98.61 CAD S/P PERCUTANEOUS CORONARY ANGIOPLASTY: ICD-10-CM

## 2025-02-18 DIAGNOSIS — I25.10 CAD S/P PERCUTANEOUS CORONARY ANGIOPLASTY: ICD-10-CM

## 2025-02-18 DIAGNOSIS — Z87.891 FORMER SMOKER: ICD-10-CM

## 2025-02-18 DIAGNOSIS — R06.02 SOB (SHORTNESS OF BREATH) ON EXERTION: ICD-10-CM

## 2025-02-18 DIAGNOSIS — I48.11 LONGSTANDING PERSISTENT ATRIAL FIBRILLATION (MULTI): ICD-10-CM

## 2025-02-18 PROCEDURE — 99214 OFFICE O/P EST MOD 30 MIN: CPT | Performed by: INTERNAL MEDICINE

## 2025-02-18 PROCEDURE — 1036F TOBACCO NON-USER: CPT | Performed by: INTERNAL MEDICINE

## 2025-02-18 PROCEDURE — 3074F SYST BP LT 130 MM HG: CPT | Performed by: INTERNAL MEDICINE

## 2025-02-18 PROCEDURE — 1157F ADVNC CARE PLAN IN RCRD: CPT | Performed by: INTERNAL MEDICINE

## 2025-02-18 PROCEDURE — 1123F ACP DISCUSS/DSCN MKR DOCD: CPT | Performed by: INTERNAL MEDICINE

## 2025-02-18 PROCEDURE — 93000 ELECTROCARDIOGRAM COMPLETE: CPT | Performed by: INTERNAL MEDICINE

## 2025-02-18 PROCEDURE — 1159F MED LIST DOCD IN RCRD: CPT | Performed by: INTERNAL MEDICINE

## 2025-02-18 PROCEDURE — 3078F DIAST BP <80 MM HG: CPT | Performed by: INTERNAL MEDICINE

## 2025-02-18 NOTE — PROGRESS NOTES
Patient:  Gutierrez Aldana  YOB: 1950  MRN: [id number]       HPI:       Gutierrez Aldana is a 75 y.o. male who returns today for cardiac follow-up.  Patient is doing well.  Chest pain shortness of breath or other complaints.  He denies any palpitations.  He has a history of car disease angioplasty and stenting.  He also has persistent A-fib although currently EKG today is normal sinus rhythm.  He is not out of breath.  His vitals are stable.  His examination is unchanged from before.      Objective:     Vitals:    02/18/25 1254   BP: 124/68   Pulse: 58       Wt Readings from Last 4 Encounters:   02/18/25 91.7 kg (202 lb 1.6 oz)   12/16/24 92 kg (202 lb 12.8 oz)   10/18/24 89.8 kg (198 lb)   06/18/24 87.4 kg (192 lb 9.6 oz)       Allergies:     No Known Allergies     Medications:     Current Outpatient Medications   Medication Instructions    acetaminophen (Tylenol) 325 mg capsule Take by mouth.    aspirin 81 mg EC tablet 1 tablet, 2 times daily    dofetilide (TIKOSYN) 250 mcg, oral, 2 times daily    Eliquis 5 mg, oral, 2 times daily    gabapentin (NEURONTIN) 100 mg, oral    lisinopril 20 mg tablet TAKE 1 TABLET BY MOUTH IN THE MORNING AND 2 IN THE EVENING    methylcellulose oral powder Daily    metoprolol tartrate (LOPRESSOR) 25 mg, oral, 2 times daily    nitroglycerin (Nitrostat) 0.4 mg SL tablet PLACE 1 TABLET UNDER THE TONGUE EVERY 5 MINUTES FOR UP TO 3 DOSES AS NEEDED FOR CHEST PAIN. CALL 911 IF PAIN PERSISTS.    simvastatin (ZOCOR) 40 mg, oral, Nightly    vit A,C and E-lutein-minerals (Ocuvite with Lutein) 300 mcg-200 mg-27 mg-2 mg tablet 1 tablet, Daily       Physical Examination:   GENERAL:  Well developed, well nourished, in no acute distress.  CHEST:  Symmetric and nontender.  NEURO/PSYCH:  Alert and oriented times three with approppriate behavior and responses.  NECK:  Supple, no JVD, no bruit.  LUNGS:  Clear to auscultation bilaterally, normal respiratory effort.  HEART:  Rate and rhythm  "regular with no evident murmur, no gallop appreciated.        There are no rubs, clicks or heaves.  EXTREMITIES:  Warm with good color, no clubbing or cyanosis.  There is no edema noted.  PERIPHERAL VASCULAR:  Pulses present and equally palpable; 2+ throughout.      Lab:     CBC:   Lab Results   Component Value Date    WBC 6.4 06/18/2024    RBC 3.73 (L) 06/18/2024    HGB 12.9 (L) 06/18/2024    HCT 42.1 06/18/2024     06/18/2024        CMP:    Lab Results   Component Value Date     06/18/2024    K 5.2 06/18/2024     06/18/2024    CO2 31 06/18/2024    BUN 18 06/18/2024    CREATININE 1.08 06/18/2024    GLUCOSE 105 (H) 06/18/2024    CALCIUM 9.8 06/18/2024       Magnesium:    Lab Results   Component Value Date    MG 2.07 06/18/2024       Lipid Profile:    Lab Results   Component Value Date    TRIG 68 06/18/2024    HDL 63.0 06/18/2024    LDLCALC 66 06/18/2024       TSH:    No results found for: \"TSH\"    BNP:   Lab Results   Component Value Date     (H) 10/15/2022        PT/INR:    Lab Results   Component Value Date    PROTIME 13.9 (H) 10/15/2022    INR 1.2 (H) 10/15/2022       HgBA1c:    No results found for: \"HGBA1C\"    BMP:  Lab Results   Component Value Date     06/18/2024     06/06/2023     10/15/2022     02/21/2022    K 5.2 06/18/2024    K 5.1 06/06/2023    K 4.2 10/15/2022    K 4.3 02/21/2022     06/18/2024     06/06/2023     10/15/2022     02/21/2022    CO2 31 06/18/2024    CO2 28 06/06/2023    CO2 24 10/15/2022    CO2 31 02/21/2022    BUN 18 06/18/2024    BUN 20 06/06/2023    BUN 18 10/15/2022    BUN 13 02/21/2022    CREATININE 1.08 06/18/2024    CREATININE 0.98 06/06/2023    CREATININE 0.99 10/15/2022    CREATININE 1.15 02/21/2022       CBC:  Lab Results   Component Value Date    WBC 6.4 06/18/2024    WBC 6.5 06/06/2023    WBC 9.6 10/15/2022    WBC 5.2 09/08/2020    RBC 3.73 (L) 06/18/2024    RBC 4.01 (L) 06/06/2023    RBC 3.83 (L) " 10/15/2022    RBC 3.73 (L) 09/08/2020    HGB 12.9 (L) 06/18/2024    HGB 14.5 06/06/2023    HGB 13.7 10/15/2022    HGB 13.5 09/08/2020    HCT 42.1 06/18/2024    HCT 46.4 06/06/2023    HCT 43.3 10/15/2022    HCT 42.2 09/08/2020     (H) 06/18/2024     (H) 06/06/2023     (H) 10/15/2022     (H) 09/08/2020    MCH 34.6 (H) 06/18/2024    MCHC 30.6 (L) 06/18/2024    MCHC 31.3 (L) 06/06/2023    MCHC 31.6 (L) 10/15/2022    MCHC 32.0 09/08/2020    RDW 15.3 (H) 06/18/2024    RDW 12.7 06/06/2023    RDW 13.0 10/15/2022    RDW 12.9 09/08/2020     06/18/2024     06/06/2023     10/15/2022     09/08/2020       Cardiac Enzymes:    Lab Results   Component Value Date    TROPHS 5 10/15/2022    TROPHS 4 10/15/2022       Hepatic Function Panel:    Lab Results   Component Value Date    ALKPHOS 52 06/18/2024    ALT 15 06/18/2024    AST 17 06/18/2024    PROT 6.7 06/18/2024    BILITOT 0.4 06/18/2024       Diagnostic Studies:     Vascular US Abdominal Aorta Aneurysm AAA Screening    Result Date: 12/19/2024  Interpreted By:  Santino Nguyen, STUDY: [facility] US ABDOMINAL AORTA ANEURYSM AAA SCREENING;  12/18/2024 10:58 am   INDICATION: Signs/Symptoms:screen.   ,Z13.6 Encounter for screening for cardiovascular disorders   COMPARISON: None.   ACCESSION NUMBER(S): [id number]   ORDERING CLINICIAN: KAZ LEE   TECHNIQUE: Transabdominal imaging of the abdominal aorta was performed. Grayscale, color, and spectral Doppler were performed.   FINDINGS: The proximal abdominal aorta measures 0.8CM in its maximal AP diameter and 1.8CM in its maximal transverse diameter. The mid abdominal aorta measures 1.1 cm in its maximal AP diameter and 1.4 cm in its maximal transverse diameter. The distal abdominal aorta measures 1.1 cm in its maximal AP diameter and 1.8 cm in its maximal transverse diameter.   The visualized portions of the right common iliac artery measures 0.5 cm in its maximal AP diameter and 0.9 cm  in its maximal transverse diameter.   The visualized portions of the left common iliac artery measures 0.6 cm in its maximal AP diameter and 1.1 cm in its maximal transverse diameter.       No evidence for abdominal aortic aneurysm.   MACRO: None   Signed by: Santino Nguyen 12/19/2024 6:28 AM Dictation workstation:   [id number]      Radiology:     No orders to display       Problem List:     Patient Active Problem List   Diagnosis    Numbness and tingling in left arm    Nasal congestion    Mixed hyperlipidemia    Longstanding persistent atrial fibrillation (Multi)    Personal history of colonic polyps    CAD S/P percutaneous coronary angioplasty    Benign essential hypertension    Atherosclerosis of both lower extremities with intermittent claudication (CMS-HCC)    Arthritis    Former smoker    High risk medication use    Tubular adenoma of colon    Grade III hemorrhoids    Diverticulosis of colon    Encounter for subsequent annual wellness visit (AWV) in Medicare patient    SOB (shortness of breath) on exertion    Acute bacterial bronchitis    BMI 28.0-28.9,adult    Encounter for medication review and counseling    Encounter to discuss treatment options    Encounter to establish care    Panlobular emphysema (Multi)       Asessment:       75-year-old gentleman here for routine cardiovascular follow-up.    Meds, vitals, examination as noted    Chart review details cussed the patient at length.    Impression:  ASHD class I-II  Remote PCI and stenting  Persistent atrial fibrillation currently sinus rhythm  Dyslipidemia  Hypertension  Dyslipidemia  Chronic anticoagulation    Condition:  Continue current meds  See me in 6 months  Repeat normal noninvasive testing after his next visit  Call should any other issues or problems develop  Maintain anticoagulation safety measures

## 2025-03-01 DIAGNOSIS — M19.90 ARTHRITIS: ICD-10-CM

## 2025-03-03 RX ORDER — GABAPENTIN 100 MG/1
100 CAPSULE ORAL
Qty: 90 CAPSULE | Refills: 0 | Status: SHIPPED | OUTPATIENT
Start: 2025-03-03

## 2025-03-03 NOTE — TELEPHONE ENCOUNTER
Rx Refill Request     Name: Gutierrez Aldana  :  1950     Date of last appointment:  2024   Date of next appointment:  2025   Best number to reach patient:  [phone]

## 2025-05-23 DIAGNOSIS — I48.11 LONGSTANDING PERSISTENT ATRIAL FIBRILLATION (MULTI): ICD-10-CM

## 2025-05-23 DIAGNOSIS — M19.90 ARTHRITIS: Primary | ICD-10-CM

## 2025-05-23 RX ORDER — GABAPENTIN 100 MG/1
100 CAPSULE ORAL NIGHTLY
Qty: 90 CAPSULE | Refills: 0 | Status: SHIPPED | OUTPATIENT
Start: 2025-05-23 | End: 2025-08-21

## 2025-05-23 RX ORDER — METOPROLOL TARTRATE 25 MG/1
25 TABLET, FILM COATED ORAL 2 TIMES DAILY
Qty: 180 TABLET | Refills: 3 | Status: SHIPPED | OUTPATIENT
Start: 2025-05-23 | End: 2026-05-23

## 2025-05-23 NOTE — TELEPHONE ENCOUNTER
Received request for prescription refills for patient.   Patient follows with Dr. Peralta    Request is for metoprolol  Is patient currently on medication yes    Last OV 2/18/2025  Next OV 9/2/2025    Pended for signing and sent to provider

## 2025-05-23 NOTE — TELEPHONE ENCOUNTER
Received request for prescription refills for patient.   Patient follows with Dr. Peralta    Request is for Eliquis  Is patient currently on medication yes    Last OV 2/18/2025  Next OV 9/2/2025    Pended for signing and sent to provider

## 2025-05-23 NOTE — TELEPHONE ENCOUNTER
Rx Refill Request     Name: Gutierrez Aldana  :  1950   Medication Name:     gabapentin (Neurontin) 100 mg capsule    Last fill: 2025 90 day supply Q 90 R 0  Specific Pharmacy location:  [facility]  Date of last appointment:  2024   Date of next appointment:  2025   Best number to reach patient:  [phone]       RX PENDED to [facility] as directed by Electronic request     Home

## 2025-06-17 LAB
ALBUMIN SERPL-MCNC: 4.5 G/DL (ref 3.6–5.1)
ALP SERPL-CCNC: 51 U/L (ref 35–144)
ALT SERPL-CCNC: 16 U/L (ref 9–46)
ANION GAP SERPL CALCULATED.4IONS-SCNC: 7 MMOL/L (CALC) (ref 7–17)
AST SERPL-CCNC: 19 U/L (ref 10–35)
BASOPHILS # BLD AUTO: 90 CELLS/UL (ref 0–200)
BASOPHILS NFR BLD AUTO: 1.6 %
BILIRUB SERPL-MCNC: 0.4 MG/DL (ref 0.2–1.2)
BUN SERPL-MCNC: 26 MG/DL (ref 7–25)
CALCIUM SERPL-MCNC: 9.3 MG/DL (ref 8.6–10.3)
CHLORIDE SERPL-SCNC: 107 MMOL/L (ref 98–110)
CHOLEST SERPL-MCNC: 119 MG/DL
CHOLEST/HDLC SERPL: 2.4 (CALC)
CO2 SERPL-SCNC: 26 MMOL/L (ref 20–32)
CREAT SERPL-MCNC: 1.04 MG/DL (ref 0.7–1.28)
EGFRCR SERPLBLD CKD-EPI 2021: 75 ML/MIN/1.73M2
EOSINOPHIL # BLD AUTO: 174 CELLS/UL (ref 15–500)
EOSINOPHIL NFR BLD AUTO: 3.1 %
ERYTHROCYTE [DISTWIDTH] IN BLOOD BY AUTOMATED COUNT: 14.6 % (ref 11–15)
GLUCOSE SERPL-MCNC: 107 MG/DL (ref 65–139)
HCT VFR BLD AUTO: 39.4 % (ref 38.5–50)
HDLC SERPL-MCNC: 49 MG/DL
HGB BLD-MCNC: 12.8 G/DL (ref 13.2–17.1)
LDLC SERPL CALC-MCNC: 49 MG/DL (CALC)
LYMPHOCYTES # BLD AUTO: 1674 CELLS/UL (ref 850–3900)
LYMPHOCYTES NFR BLD AUTO: 29.9 %
MAGNESIUM SERPL-MCNC: 2.3 MG/DL (ref 1.5–2.5)
MCH RBC QN AUTO: 36.8 PG (ref 27–33)
MCHC RBC AUTO-ENTMCNC: 32.5 G/DL (ref 32–36)
MCV RBC AUTO: 113.2 FL (ref 80–100)
MONOCYTES # BLD AUTO: 790 CELLS/UL (ref 200–950)
MONOCYTES NFR BLD AUTO: 14.1 %
NEUTROPHILS # BLD AUTO: 2873 CELLS/UL (ref 1500–7800)
NEUTROPHILS NFR BLD AUTO: 51.3 %
NONHDLC SERPL-MCNC: 70 MG/DL (CALC)
PLATELET # BLD AUTO: 157 THOUSAND/UL (ref 140–400)
PMV BLD REES-ECKER: 9.8 FL (ref 7.5–12.5)
POTASSIUM SERPL-SCNC: 5.8 MMOL/L (ref 3.5–5.3)
PROT SERPL-MCNC: 7 G/DL (ref 6.1–8.1)
RBC # BLD AUTO: 3.48 MILLION/UL (ref 4.2–5.8)
SODIUM SERPL-SCNC: 140 MMOL/L (ref 135–146)
TRIGL SERPL-MCNC: 130 MG/DL
WBC # BLD AUTO: 5.6 THOUSAND/UL (ref 3.8–10.8)

## 2025-06-25 NOTE — PROGRESS NOTES
"Subjective   Patient ID:  Gutierrez Aldana is a 75 y.o. male patient who presents today for Hypertension, Hyperlipidemia, Atrial Fibrillation, and Emphysema.    Hypertension: Blood pressure is normal.  Hyperlipidemia is fairly well-controlled the patient is not eating the healthiest diet.  COPD: Well-controlled with inhaler use.    Most recent blood work shows elevated potassium at 5.8 He was advised to reduce consumption of potassium rich foods including bananas, orange juice, tomatoes, and potatoes. He likes to eat banana and tomatoes, although is trying to cut down on these.    Blood work also shows that he is slightly anemic with low RBC 3.48 and Hgb 12.8. He was recommended a slight increase in iron rich foods including red meat.    Review of Systems      Objective   Vitals:  /62   Pulse 61   Temp 36 °C (96.8 °F)   Resp 14   Ht 1.765 m (5' 9.5\")   Wt 89.5 kg (197 lb 6.4 oz)   SpO2 96%   BMI 28.73 kg/m²       Physical Exam  Vitals reviewed.   Constitutional:       Appearance: Normal appearance.   Neck:      Vascular: No carotid bruit.     Cardiovascular:      Rate and Rhythm: Normal rate and regular rhythm.      Pulses: Normal pulses.      Heart sounds: Normal heart sounds.   Pulmonary:      Effort: Pulmonary effort is normal. No respiratory distress.      Breath sounds: Normal breath sounds. No wheezing.   Abdominal:      General: There is no distension.      Palpations: Abdomen is soft. There is no mass.      Tenderness: There is no abdominal tenderness. There is no right CVA tenderness, left CVA tenderness, guarding or rebound.     Musculoskeletal:      Cervical back: Normal range of motion and neck supple. No rigidity.      Right lower leg: No edema.      Left lower leg: No edema.   Lymphadenopathy:      Cervical: No cervical adenopathy.     Neurological:      Mental Status: He is alert.         Labs reviewed from :              CMP, CBC, Lipid      Assessment/Plan   Problem List Items Addressed " This Visit          Cardiac and Vasculature    Mixed hyperlipidemia    Current Assessment & Plan   Is well controlled, continue current regimen.         Relevant Orders    Lipid Panel    Benign essential hypertension    Current Assessment & Plan   Is well controlled, continue current regimen.          Relevant Orders    Follow Up In [facility] - PCP - Medicare Annual    CBC and Auto Differential    Comprehensive Metabolic Panel    Magnesium       Pulmonary and Pneumonias    Panlobular emphysema (Multi)    Current Assessment & Plan   Stable, continue current regimen.          He was advised to cut down on potassium rich foods which include bananas, orange juice, tomatoes and potatoes.       Follow up in: 5 month(s) for Wellness and follow-up of the above issues or sooner if needed with labs prior.       Scribe Attestation  By signing my name below, IYeimy Scribe attest that this documentation has been prepared under the direction and in the presence of Juan Ramon De Guzman MD.    Scribe Attestation  By signing my name below, Jessica BOWEN, Rajeev   attest that this documentation has been prepared under the direction and in the presence of Juan Ramon De Guzman MD.  I, Juan Ramon De Guzman MD, personally performed the services described in the documentation as scribed by Jessiac Wood in my presence and confirm that it is both accurate and complete.

## 2025-06-27 DIAGNOSIS — I48.11 LONGSTANDING PERSISTENT ATRIAL FIBRILLATION (MULTI): ICD-10-CM

## 2025-06-27 NOTE — TELEPHONE ENCOUNTER
Received request for prescription refills for patient.   Patient follows with Dr. Peralta    Request is for dofetilide  Is patient currently on medication yes    Last OV 2/18/2025  Next OV 9/2/2025    Pended for signing and sent to provider

## 2025-06-30 RX ORDER — DOFETILIDE 0.25 MG/1
250 CAPSULE ORAL 2 TIMES DAILY
Qty: 180 CAPSULE | Refills: 3 | Status: SHIPPED | OUTPATIENT
Start: 2025-06-30 | End: 2026-06-30

## 2025-07-29 ENCOUNTER — APPOINTMENT (OUTPATIENT)
Dept: PRIMARY CARE | Facility: CLINIC | Age: 75
End: 2025-07-29
Payer: MEDICARE

## 2025-07-29 VITALS
RESPIRATION RATE: 14 BRPM | WEIGHT: 197.4 LBS | TEMPERATURE: 96.8 F | HEIGHT: 70 IN | OXYGEN SATURATION: 96 % | BODY MASS INDEX: 28.26 KG/M2 | HEART RATE: 61 BPM | DIASTOLIC BLOOD PRESSURE: 62 MMHG | SYSTOLIC BLOOD PRESSURE: 124 MMHG

## 2025-07-29 DIAGNOSIS — I10 BENIGN ESSENTIAL HYPERTENSION: ICD-10-CM

## 2025-07-29 DIAGNOSIS — E78.2 MIXED HYPERLIPIDEMIA: ICD-10-CM

## 2025-07-29 DIAGNOSIS — J43.1 PANLOBULAR EMPHYSEMA (MULTI): ICD-10-CM

## 2025-07-29 PROCEDURE — G2211 COMPLEX E/M VISIT ADD ON: HCPCS | Performed by: FAMILY MEDICINE

## 2025-07-29 PROCEDURE — 3078F DIAST BP <80 MM HG: CPT | Performed by: FAMILY MEDICINE

## 2025-07-29 PROCEDURE — 1159F MED LIST DOCD IN RCRD: CPT | Performed by: FAMILY MEDICINE

## 2025-07-29 PROCEDURE — 99214 OFFICE O/P EST MOD 30 MIN: CPT | Performed by: FAMILY MEDICINE

## 2025-07-29 PROCEDURE — 3074F SYST BP LT 130 MM HG: CPT | Performed by: FAMILY MEDICINE

## 2025-07-29 PROCEDURE — 1160F RVW MEDS BY RX/DR IN RCRD: CPT | Performed by: FAMILY MEDICINE

## 2025-07-29 ASSESSMENT — ENCOUNTER SYMPTOMS
LOSS OF SENSATION IN FEET: 0
OCCASIONAL FEELINGS OF UNSTEADINESS: 0
DEPRESSION: 0

## 2025-08-21 DIAGNOSIS — E78.2 MIXED HYPERLIPIDEMIA: ICD-10-CM

## 2025-08-21 DIAGNOSIS — M19.90 ARTHRITIS: ICD-10-CM

## 2025-08-21 RX ORDER — SIMVASTATIN 40 MG/1
40 TABLET, FILM COATED ORAL NIGHTLY
Qty: 90 TABLET | Refills: 3 | Status: SHIPPED | OUTPATIENT
Start: 2025-08-21 | End: 2026-08-21

## 2025-08-22 RX ORDER — GABAPENTIN 100 MG/1
100 CAPSULE ORAL
Qty: 90 CAPSULE | Refills: 0 | Status: SHIPPED | OUTPATIENT
Start: 2025-08-22

## 2025-09-02 ENCOUNTER — APPOINTMENT (OUTPATIENT)
Dept: CARDIOLOGY | Facility: CLINIC | Age: 75
End: 2025-09-02
Payer: MEDICARE

## 2025-10-24 ENCOUNTER — APPOINTMENT (OUTPATIENT)
Dept: CARDIOLOGY | Facility: CLINIC | Age: 75
End: 2025-10-24
Payer: MEDICARE

## 2025-12-17 ENCOUNTER — APPOINTMENT (OUTPATIENT)
Dept: PRIMARY CARE | Facility: CLINIC | Age: 75
End: 2025-12-17
Payer: MEDICARE

## 2026-02-12 ENCOUNTER — APPOINTMENT (OUTPATIENT)
Dept: CARDIOLOGY | Facility: CLINIC | Age: 76
End: 2026-02-12
Payer: MEDICARE